# Patient Record
Sex: MALE | Race: WHITE | NOT HISPANIC OR LATINO | Employment: OTHER | URBAN - METROPOLITAN AREA
[De-identification: names, ages, dates, MRNs, and addresses within clinical notes are randomized per-mention and may not be internally consistent; named-entity substitution may affect disease eponyms.]

---

## 2017-01-03 ENCOUNTER — OFFICE VISIT (OUTPATIENT)
Dept: FAMILY MEDICINE | Facility: CLINIC | Age: 57
End: 2017-01-03

## 2017-01-03 VITALS
RESPIRATION RATE: 20 BRPM | TEMPERATURE: 97.6 F | WEIGHT: 175.8 LBS | BODY MASS INDEX: 26.64 KG/M2 | DIASTOLIC BLOOD PRESSURE: 84 MMHG | HEART RATE: 60 BPM | SYSTOLIC BLOOD PRESSURE: 128 MMHG | HEIGHT: 68 IN

## 2017-01-03 DIAGNOSIS — E78.00 PURE HYPERCHOLESTEROLEMIA: ICD-10-CM

## 2017-01-03 DIAGNOSIS — Z11.59 NEED FOR HEPATITIS C SCREENING TEST: ICD-10-CM

## 2017-01-03 DIAGNOSIS — Z23 NEED FOR HEPATITIS A IMMUNIZATION: ICD-10-CM

## 2017-01-03 DIAGNOSIS — Z00.00 ENCOUNTER FOR ROUTINE ADULT HEALTH EXAMINATION WITHOUT ABNORMAL FINDINGS: Primary | ICD-10-CM

## 2017-01-03 DIAGNOSIS — Z13.228 SCREENING FOR METABOLIC DISORDER: ICD-10-CM

## 2017-01-03 PROCEDURE — 80053 COMPREHEN METABOLIC PANEL: CPT | Mod: 90 | Performed by: PHYSICIAN ASSISTANT

## 2017-01-03 PROCEDURE — 80061 LIPID PANEL: CPT | Mod: 90 | Performed by: PHYSICIAN ASSISTANT

## 2017-01-03 PROCEDURE — 90471 IMMUNIZATION ADMIN: CPT | Performed by: PHYSICIAN ASSISTANT

## 2017-01-03 PROCEDURE — 86803 HEPATITIS C AB TEST: CPT | Mod: 90 | Performed by: PHYSICIAN ASSISTANT

## 2017-01-03 PROCEDURE — 99396 PREV VISIT EST AGE 40-64: CPT | Mod: 25 | Performed by: PHYSICIAN ASSISTANT

## 2017-01-03 PROCEDURE — 36415 COLL VENOUS BLD VENIPUNCTURE: CPT | Performed by: PHYSICIAN ASSISTANT

## 2017-01-03 PROCEDURE — 90632 HEPA VACCINE ADULT IM: CPT | Performed by: PHYSICIAN ASSISTANT

## 2017-01-03 RX ORDER — SIMVASTATIN 20 MG
20 TABLET ORAL AT BEDTIME
Qty: 90 TABLET | Refills: 3 | Status: SHIPPED | OUTPATIENT
Start: 2017-01-03 | End: 2018-04-10

## 2017-01-03 NOTE — PROGRESS NOTES
Momo Burns is a 56 year old male presents for routine health maintenance.    Current concerns: Recently was low on simvastatin for the last month and only took it on about half the days. Also has not been eating as well over the holiday. Momo also has been going on yearly mission trips to [place] and would like to be immunized for hepatitis A.     Body mass index is 27.11 kg/(m^2).    Present exercise habits:  Active at work with lifting and walking. Occasional formal work-outs.  Present dietary habits:  eats regular meals and follows a balanced nutrition diet. Could work on fruits and veggies. Eats leans proteins.    Vit D intake: is not taking supplement    Is the patient a smoker? No  If yes, smoking cessation advised and counseling provided.     Cardiovascular risk factors: previous smoker and lipids    Over the past few weeks, have you felt down or depressed? Little interest or pleasure in doing things? No. Lost his wife 1-2 years ago, and recently had a couple days of depressive symptoms, but was able to tap into support system and is now feeling normal again.     Last dental appointment:  this year  Last optical appointment:  this year    Was the patient born between [birth year] and has not had Hep C testing?  Yes, test will be ordered today    I have reviewed the following histories: Past Medical History, Past Surgical History, Social History, Family History, Problem List, Medication List and Allergies    Past Medical History   Diagnosis Date     Tendinitis of hand      Family History   Problem Relation Age of Onset     Respiratory Mother      copd  age 77     Neurologic Disorder Father      cva, heavy smoker , from disease, also cva     Cardiovascular Sister      marfans, aortic valve repair, 2 valves,      Genetic Disorder Sister      marfans     Breast Cancer Sister      40s     Colon Cancer No family hx of      Prostate Cancer No family hx of      Social History     Social History      "Marital Status:      Spouse Name: N/A     Number of Children: N/A     Years of Education: N/A     Occupational History     manager [employer]           Social History Main Topics     Smoking status: Former Smoker -- 0.20 packs/day for 3 years     Types: Cigarettes     Smokeless tobacco: Never Used     Alcohol Use: 1.8 - 3.6 oz/week     3-6 Standard drinks or equivalent per week     Drug Use: Not on file     Sexual Activity: Yes     Other Topics Concern      Service No     Blood Transfusions No     Occupational Exposure No     Hobby Hazards No     Sleep Concern No     Stress Concern No     Weight Concern No     Special Diet No     Back Care No     Exercise Yes     abimbola Fu,      Seat Belt Yes     Self-Exams No     Social History Narrative     Patient Active Problem List   Diagnosis     Mitral valve disorder     Pure hypercholesterolemia     Family history of congenital heart disease/marfans     Health Care Home     ACP (advance care planning)     Current Outpatient Prescriptions   Medication     simvastatin (ZOCOR) 20 MG tablet     [DISCONTINUED] simvastatin (ZOCOR) 20 MG tablet     No current facility-administered medications for this visit.       Allergies:  Allergies   Allergen Reactions     No Known Drug Allergies          ROS:  E/M: NEGATIVE for ear, nose, mouth and throat problems  R: NEGATIVE for significant/chronic cough or SOB  CV: NEGATIVE for chest pain or palpitations  GI: NEGATIVE for abdominal pain, chronic diarrhea or constipation  : NEGATIVE for dysuria, hematuria. Positive for weakened urinary stream.      OBJECTIVE:    Filed Vitals:    01/03/17 0801   BP: 128/84   Pulse: 60   Temp: 97.6  F (36.4  C)   TempSrc: Oral   Resp: 20   Height: 1.715 m (5' 7.5\")   Weight: 79.742 kg (175 lb 12.8 oz)       General: 56 year old male who appears her stated age. Vital signs noted  Head: Normocephalic  Eyes: pupils equal round reactive to light and accomodation, extra ocular " "movements intact  Ears: external canals and tms free of abnormalities  Nose: patent, without mucosal abnormalities  Mouth and throat: without erythema or lesions of the mucosa  Neck: supple, without adenopathy or thyromegaly  Lungs: clear to auscultation, no wheezing or crackles  Cv: regular rate and rhythm, normal s1 and s2 without murmur or click  Abd: soft, non-tender, no masses, no hepatomegaly or splenomegaly.  Gu: normal external genitalia, no hernia  Rectal: no masses, prostate normal size and density.  Ms: normal muscle tone & symmetry  Skin: Clear to inspection  Neuro: sensation and motor function grossly intact; cranial nerves without obvious abnormalities.        ASSESSMENT/PLAN:    1. Encounter for routine adult health examination without abnormal findings    2. Pure hypercholesterolemia  - simvastatin (ZOCOR) 20 MG tablet; Take 1 tablet (20 mg) by mouth At Bedtime  Dispense: 90 tablet; Refill: 3  - VENOUS COLLECTION  - COMPREHENSIVE METABOLIC PANEL (QUEST) XCMP  - Lipid Profile (QUEST)    3. Screening for metabolic disorder  - COMPREHENSIVE METABOLIC PANEL (QUEST) XCMP    4. Need for hepatitis C screening test  - Hepatits C antibody (QUEST)     reports that he has never smoked. He has never used smokeless tobacco.      Estimated body mass index is 27.11 kg/(m^2) as calculated from the following:    Height as of this encounter: 1.715 m (5' 7.5\").    Weight as of this encounter: 79.742 kg (175 lb 12.8 oz).  Weight management plan: Discussed healthy diet and exercise guidelines and patient will follow up in 12 months in clinic to re-evaluate.      Labs pending:      Fasting glucose      Fasting lipids      Hepatitis C  Meds Suggested:      Vitamin D  Tests Recommended:      Regular Dental Examinations        Eye exam  Behavior Modifications:       Cardiovascular exercise 3 times per week--enough to get your Target Heart rate  Immunizations suggested:       Hep A   One, then repeat within 6-12 months  Other " recommendations:     BMI noted and discussed      Regular testicle exam    The patient will return to the clinic if symptoms are changing or concern with follow up as discussed. The patient understands and agrees with the plan.      Anita Gomes PA-C  1/3/2017          Counseling Resources:  ATP IV Guidelines  Pooled Cohorts Equation Calculator  Breast Cancer Risk Calculator  FRAX Risk Assessment  ICSI Preventive Guidelines  Dietary Guidelines for Americans, 2010  [email or web address]'s MyPlate

## 2017-01-03 NOTE — NURSING NOTE
Momo Burns is here for a CPX.    Pre-visit planning  Immunizations -up to date  Colonoscopy -is up to date  Mammogram -  Asthma test --  PHQ9 -  PARTH 7 -    Questioned patient about current smoking habits.  Pt. has never smoked.  Body mass index is 27.11 kg/(m^2).  BP Cuff left  Arm  M  Cuff  PULSE regular  My Chart: active  CLASSIFICATION OF OVERWEIGHT AND OBESITY BY BMI                        Obesity Class           BMI(kg/m2)  Underweight                                    < 18.5  Normal                                         18.5-24.9  Overweight                                     25.0-29.9  OBESITY                     I                  30.0-34.9                             II                 35.0-39.9  EXTREME OBESITY             III                >40                            Patient's  BMI Body mass index is 27.11 kg/(m^2).  Http://hin.nhlbi.nih.gov/menuplanner/menu.cgi  ETOH screening:  Questions:  1-How often do you have a drink containing alcohol?                             3 times per week(s)  2-How many drinks containing alcohol do you have on a typical day when you are         Drinking?                              1 -2  3- How often do you have 5 or more drinks on one occasion?                              never per     Have you ever:  @None of the patient's responses to the CAGE screening were positive / Negative CAGE score@

## 2017-01-04 LAB
ALBUMIN SERPL-MCNC: 4.6 G/DL (ref 3.6–5.1)
ALBUMIN/GLOB SERPL: 1.8 (CALC) (ref 1–2.5)
ALP SERPL-CCNC: 49 U/L (ref 40–115)
ALT SERPL-CCNC: 23 U/L (ref 9–46)
AST SERPL-CCNC: 25 U/L (ref 10–35)
BILIRUB SERPL-MCNC: 0.8 MG/DL (ref 0.2–1.2)
BUN SERPL-MCNC: 13 MG/DL (ref 7–25)
BUN/CREATININE RATIO: NORMAL (CALC) (ref 6–22)
CALCIUM SERPL-MCNC: 9.3 MG/DL (ref 8.6–10.3)
CHLORIDE SERPLBLD-SCNC: 104 MMOL/L (ref 98–110)
CHOLEST SERPL-MCNC: 236 MG/DL (ref 125–200)
CHOLEST/HDLC SERPL: 2.9 (CALC)
CO2 SERPL-SCNC: 28 MMOL/L (ref 20–31)
CREAT SERPL-MCNC: 0.79 MG/DL (ref 0.7–1.33)
EGFR AFRICAN AMERICAN - QUEST: 118 ML/MIN/1.73M2
GFR SERPL CREATININE-BSD FRML MDRD: 102 ML/MIN/1.73M2
GLOBULIN, CALCULATED - QUEST: 2.6 G/DL (CALC) (ref 1.9–3.7)
GLUCOSE - QUEST: 96 MG/DL (ref 65–99)
HCV AB - QUEST: NORMAL
HDLC SERPL-MCNC: 81 MG/DL
LDLC SERPL CALC-MCNC: 142 MG/DL (CALC)
NONHDLC SERPL-MCNC: 155 MG/DL (CALC)
POTASSIUM SERPL-SCNC: 4.4 MMOL/L (ref 3.5–5.3)
PROT SERPL-MCNC: 7.2 G/DL (ref 6.1–8.1)
SIGNAL TO CUT OFF - QUEST: 0.05
SODIUM SERPL-SCNC: 139 MMOL/L (ref 135–146)
TRIGL SERPL-MCNC: 64 MG/DL

## 2017-10-10 ENCOUNTER — ALLIED HEALTH/NURSE VISIT (OUTPATIENT)
Dept: FAMILY MEDICINE | Facility: CLINIC | Age: 57
End: 2017-10-10

## 2017-10-10 DIAGNOSIS — Z23 NEED FOR VACCINATION: Primary | ICD-10-CM

## 2017-10-10 PROCEDURE — 90471 IMMUNIZATION ADMIN: CPT | Performed by: PHYSICIAN ASSISTANT

## 2017-10-10 PROCEDURE — 90632 HEPA VACCINE ADULT IM: CPT | Performed by: PHYSICIAN ASSISTANT

## 2017-10-26 ENCOUNTER — TRANSFERRED RECORDS (OUTPATIENT)
Dept: FAMILY MEDICINE | Facility: CLINIC | Age: 57
End: 2017-10-26

## 2018-03-06 ENCOUNTER — TELEPHONE (OUTPATIENT)
Dept: FAMILY MEDICINE | Facility: CLINIC | Age: 58
End: 2018-03-06

## 2018-03-06 NOTE — TELEPHONE ENCOUNTER
Denied refill of simvastatin to [facility] pharmacy. Pt last seen 1/3/17. Needs OV for refills. Sent Pt my chart

## 2018-04-10 ENCOUNTER — OFFICE VISIT (OUTPATIENT)
Dept: FAMILY MEDICINE | Facility: CLINIC | Age: 58
End: 2018-04-10

## 2018-04-10 VITALS
TEMPERATURE: 98 F | BODY MASS INDEX: 28.09 KG/M2 | SYSTOLIC BLOOD PRESSURE: 114 MMHG | OXYGEN SATURATION: 99 % | HEIGHT: 67 IN | DIASTOLIC BLOOD PRESSURE: 82 MMHG | HEART RATE: 66 BPM | WEIGHT: 179 LBS

## 2018-04-10 DIAGNOSIS — I34.1 MITRAL VALVE PROLAPSE: ICD-10-CM

## 2018-04-10 DIAGNOSIS — E78.00 PURE HYPERCHOLESTEROLEMIA: ICD-10-CM

## 2018-04-10 DIAGNOSIS — Z00.00 ENCOUNTER FOR GENERAL MEDICAL EXAMINATION: Primary | ICD-10-CM

## 2018-04-10 PROCEDURE — 80061 LIPID PANEL: CPT | Mod: 90 | Performed by: PHYSICIAN ASSISTANT

## 2018-04-10 PROCEDURE — 80053 COMPREHEN METABOLIC PANEL: CPT | Mod: 90 | Performed by: PHYSICIAN ASSISTANT

## 2018-04-10 PROCEDURE — 36415 COLL VENOUS BLD VENIPUNCTURE: CPT | Performed by: PHYSICIAN ASSISTANT

## 2018-04-10 PROCEDURE — 99396 PREV VISIT EST AGE 40-64: CPT | Performed by: PHYSICIAN ASSISTANT

## 2018-04-10 RX ORDER — SIMVASTATIN 20 MG
20 TABLET ORAL AT BEDTIME
Qty: 90 TABLET | Refills: 3 | Status: SHIPPED | OUTPATIENT
Start: 2018-04-10 | End: 2019-05-14

## 2018-04-10 NOTE — PROGRESS NOTES
Momo Burns is a 58 year old male presents for routine health maintenance.    Current concerns: Does mention some discomfort in left arm that started when he was lifting.  Is gradually improving. Ongoing chronic knee pain, seen at [facility] 10/2017, and diagnosed with mild OA.    Body mass index is 28.25 kg/(m^2).    Present exercise habits:  Stays active at work, but nothing formal.  Present dietary habits:  eats regular meals and follows a balanced nutrition diet    Vit D intake: is not taking supplement    Is the patient a smoker? No  If yes, smoking cessation advised and counseling provided.     Cardiovascular risk factors: previous smoker    Over the past few weeks, have you felt down or depressed? Little interest or pleasure in doing things? No    Last dental appointment:  last year  Last optical appointment:  this year    Was the patient born between 1941[birth year] and has not had Hep C testing?  No, not applicable    I have reviewed the following histories: Past Medical History, Past Surgical History, Social History, Family History, Problem List, Medication List and Allergies    Past Medical History:   Diagnosis Date     Tendinitis of hand      Family History   Problem Relation Age of Onset     Respiratory Mother      copd  age 77     Neurologic Disorder Father      cva, heavy smoker , from disease, also cva     Cardiovascular Sister      marfans, aortic valve repair, 2 valves,      Genetic Disorder Sister      marfans     Breast Cancer Sister      40s     Colon Cancer No family hx of      Prostate Cancer No family hx of      Social History     Social History     Marital status:      Spouse name: N/A     Number of children: N/A     Years of education: N/A     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     Alcohol use 1.8 - 3.6 oz/week     3 - 6 Standard drinks or equivalent per week     Drug use: Not on file     Sexual activity: Yes     Other Topics Concern      Service No     Blood Transfusions No     Occupational Exposure No     Hobby Hazards No     Sleep Concern No     Stress Concern No     Weight Concern No     Special Diet No     Back Care No     Exercise Yes     abimbola Fu,      Seat Belt Yes     Self-Exams No     Social History Narrative     Patient Active Problem List   Diagnosis     Mitral valve disorder     Pure hypercholesterolemia     Family history of congenital heart disease/marfans     Health Care Home     ACP (advance care planning)     Current Outpatient Prescriptions   Medication     simvastatin (ZOCOR) 20 MG tablet     [DISCONTINUED] simvastatin (ZOCOR) 20 MG tablet     No current facility-administered medications for this visit.        Allergies:    Allergies   Allergen Reactions     No Known Drug Allergies          ROS:  E/M: NEGATIVE for ear, nose, mouth and throat problems  R: NEGATIVE for significant/chronic cough or SOB  CV: NEGATIVE for chest pain or palpitations  GI: NEGATIVE for abdominal pain, chronic diarrhea or constipation  : NEGATIVE for dysuria, hematuria, weakened urinary stream      OBJECTIVE:    Vitals:    04/10/18 0902   BP: 114/82   BP Location: Left arm   Patient Position: Sitting   Cuff Size: Adult Large   Pulse: 66   Temp: 98  F (36.7  C)   TempSrc: Oral   SpO2: 99%   Weight: 81.2 kg (179 lb)   Height: 1.695 m (5' 6.75\")       General: 58 year old male who appears his stated age. Vital signs noted.  Head: Normocephalic  Eyes: pupils equal round reactive to light and accomodation, extra ocular movements intact  Ears: external canals and tms free of abnormalities  Nose: patent, without mucosal abnormalities  Mouth and throat: without erythema or lesions of the mucosa  Neck: supple, without adenopathy or thyromegaly  Lungs: clear to auscultation, no " "wheezing or crackles  Cv: regular rate and rhythm, normal s1 and s2 with mid systolic click and murmur  Abd: soft, non-tender, no masses, no hepatomegaly or splenomegaly.  Gu: Patient declined.  Rectal: Patient declined  Ms: normal muscle tone & symmetry  Skin: Clear to inspection  Neuro: sensation and motor function grossly intact; cranial nerves without obvious abnormalities.        ASSESSMENT/PLAN:    1. Encounter for general medical examination  Momo is doing well. Will inform of fasting labs results via stickKt along with any further recommendations. Refilled simvastatin for 1 year. Encouraged diet and exercise, with goal of weight gain.     2. Pure hypercholesterolemia  - simvastatin (ZOCOR) 20 MG tablet; Take 1 tablet (20 mg) by mouth At Bedtime  Dispense: 90 tablet; Refill: 3  - VENOUS COLLECTION  - Lipid Profile (QUEST)  - Comprehensive metabolic panel     reports that he has quit smoking. His smoking use included Cigarettes. He has a 0.60 pack-year smoking history. He has never used smokeless tobacco.      Estimated body mass index is 28.25 kg/(m^2) as calculated from the following:    Height as of this encounter: 1.695 m (5' 6.75\").    Weight as of this encounter: 81.2 kg (179 lb).  Weight management plan: Discussed healthy diet and exercise guidelines and patient will follow up in 12 months in clinic to re-evaluate.      Labs pending:      Fasting glucose      Fasting lipids  Meds Suggested:      Vitamin D       Calcium  Tests Recommended:      Regular Dental Examinations        Eye exam  Behavior Modifications:       Cardiovascular exercise 3 times per week--enough to get your Target Heart rate  Other recommendations:     BMI noted and discussed      Regular testicle exam     Encouraged My Chart    The patient will return to the clinic if symptoms are changing or concern with follow up as discussed. The patient understands and agrees with the plan.      Anita Gomes, " EDDY  4/10/2018          Counseling Resources:  ATP IV Guidelines  Pooled Cohorts Equation Calculator  Breast Cancer Risk Calculator  FRAX Risk Assessment  ICSI Preventive Guidelines  Dietary Guidelines for Americans, 2010  [employer]'s MyPlate

## 2018-04-10 NOTE — MR AVS SNAPSHOT
After Visit Summary   4/10/2018    Momo Burns    MRN: [id number]           Patient Information     Date Of Birth          1960        Visit Information        Provider Department      4/10/2018 9:00 AM Anita Gomes PA-C [facility]        Today's Diagnoses     Encounter for general medical examination    -  1    Pure hypercholesterolemia        Mitral valve prolapse           Follow-ups after your visit        Follow-up notes from your care team     Return in about 1 year (around 4/10/2019) for Physical Exam.      Who to contact     If you have questions or need follow up information about today's clinic visit or your schedule please contact [facility] directly at [phone].  Normal or non-critical lab and imaging results will be communicated to you by [employer] Stam[employer]art, letter or phone within 4 business days after the clinic has received the results. If you do not hear from us within 7 days, please contact the clinic through [employer] Stam[employer] or phone. If you have a critical or abnormal lab result, we will notify you by phone as soon as possible.  Submit refill requests through [employer] or call your pharmacy and they will forward the refill request to us. Please allow 3 business days for your refill to be completed.          Additional Information About Your Visit        MyChart Information     [employer] gives you secure access to your electronic health record. If you see a primary care provider, you can also send messages to your care team and make appointments. If you have questions, please call your primary care clinic.  If you do not have a primary care provider, please call [phone] and they will assist you.        Care EveryWhere ID     This is your Care EveryWhere ID. This could be used by other organizations to access your [facility] medical records  [id number]        Your Vitals Were     Pulse Temperature Height Pulse Oximetry BMI (Body Mass  "Index)       66 98  F (36.7  C) (Oral) 1.695 m (5' 6.75\") 99% 28.25 kg/m2        Blood Pressure from Last 3 Encounters:   04/10/18 114/82   01/03/17 128/84   05/02/16 134/88    Weight from Last 3 Encounters:   04/10/18 81.2 kg (179 lb)   01/03/17 79.7 kg (175 lb 12.8 oz)   05/02/16 80 kg (176 lb 6.4 oz)              We Performed the Following     Comprehensive metabolic panel     Lipid Profile (QUEST)     VENOUS COLLECTION          Where to get your medicines      These medications were sent to [id number] - [place], [place] - [address]  [address], [facility] [address]     Phone:  [phone]     simvastatin 20 MG tablet          Primary Care Provider Office Phone # Fax #    Anita Renée Gomes PA-C [phone] [address]  [facility] [address]        Equal Access to Services     [facility]: Hadii aad ku hadasho Soomaali, waaxda luqadaha, qaybta kaalmada adeegyada, waxay brittany hayvish medina . So [facility] [phone].    ATENCIÓN: Si habla español, tiene a christensen disposición servicios gratuitos de asistencia lingüística. Llame al [phone].    We comply with applicable federal civil rights laws and [place] laws. We do not discriminate on the basis of race, color, national origin, age, disability, sex, sexual orientation, or gender identity.            Thank you!     Thank you for choosing [facility]  for your care. Our goal is always to provide you with excellent care. Hearing back from our patients is one way we can continue to improve our services. Please take a few minutes to complete the written survey that you may receive in the mail after your visit with us. Thank you!             Your Updated Medication List - Protect others around you: Learn how to safely use, store and throw away your medicines at www.disposemymeds.org.          This list is accurate as of 4/10/18 11:02 AM.  Always use your most recent " med list.                   Brand Name Dispense Instructions for use Diagnosis    simvastatin 20 MG tablet    ZOCOR    90 tablet    Take 1 tablet (20 mg) by mouth At Bedtime    Pure hypercholesterolemia

## 2018-04-10 NOTE — NURSING NOTE
Momo is here for annual physical and is fasting.     Patient is here for a full physical exam.    Pre-Visit Screening :  Immunizations : up to date  Colon Screening : is up to date   5/10/13  Mammogram: NA  Asthma Action Test/Plan : No concerns   PHQ9 :  PHQ-2 done   GAD7 :  No concerns  Patient's  BMI Body mass index is 28.25 kg/(m^2).  Questioned patient about current smoking habits.  Pt. quit smoking some time ago.  OK to leave a detailed voice message regarding today's visit Yes, phone # [phone]   (cell)      ETOH screening:  Questions:  1-How often do you have a drink containing alcohol?                             5 times per week(s)  2-How many drinks containing alcohol do you have on a typical day when you are         Drinking?                              1-5  3- How often do you have 5 or more drinks on one occasion?                              Never

## 2018-04-11 LAB
ALBUMIN SERPL-MCNC: 4.5 G/DL (ref 3.6–5.1)
ALBUMIN/GLOB SERPL: 1.8 (CALC) (ref 1–2.5)
ALP SERPL-CCNC: 54 U/L (ref 40–115)
ALT SERPL-CCNC: 26 U/L (ref 9–46)
AST SERPL-CCNC: 20 U/L (ref 10–35)
BILIRUB SERPL-MCNC: 0.7 MG/DL (ref 0.2–1.2)
BUN SERPL-MCNC: 15 MG/DL (ref 7–25)
BUN/CREATININE RATIO: NORMAL (CALC) (ref 6–22)
CALCIUM SERPL-MCNC: 9.4 MG/DL (ref 8.6–10.3)
CHLORIDE SERPLBLD-SCNC: 103 MMOL/L (ref 98–110)
CHOLEST SERPL-MCNC: 213 MG/DL
CHOLEST/HDLC SERPL: 3 (CALC)
CO2 SERPL-SCNC: 27 MMOL/L (ref 20–31)
CREAT SERPL-MCNC: 0.81 MG/DL (ref 0.7–1.33)
EGFR AFRICAN AMERICAN - QUEST: 115 ML/MIN/1.73M2
GFR SERPL CREATININE-BSD FRML MDRD: 99 ML/MIN/1.73M2
GLOBULIN, CALCULATED - QUEST: 2.5 G/DL (CALC) (ref 1.9–3.7)
GLUCOSE - QUEST: 92 MG/DL (ref 65–99)
HDLC SERPL-MCNC: 71 MG/DL
LDLC SERPL CALC-MCNC: 118 MG/DL (CALC)
NONHDLC SERPL-MCNC: 142 MG/DL (CALC)
POTASSIUM SERPL-SCNC: 4.7 MMOL/L (ref 3.5–5.3)
PROT SERPL-MCNC: 7 G/DL (ref 6.1–8.1)
SODIUM SERPL-SCNC: 137 MMOL/L (ref 135–146)
TRIGL SERPL-MCNC: 129 MG/DL

## 2019-05-14 ENCOUNTER — OFFICE VISIT (OUTPATIENT)
Dept: FAMILY MEDICINE | Facility: CLINIC | Age: 59
End: 2019-05-14

## 2019-05-14 VITALS
HEART RATE: 68 BPM | DIASTOLIC BLOOD PRESSURE: 86 MMHG | SYSTOLIC BLOOD PRESSURE: 118 MMHG | BODY MASS INDEX: 27.13 KG/M2 | TEMPERATURE: 98.5 F | WEIGHT: 179 LBS | HEIGHT: 68 IN | OXYGEN SATURATION: 98 %

## 2019-05-14 DIAGNOSIS — Z12.5 SCREENING FOR PROSTATE CANCER: ICD-10-CM

## 2019-05-14 DIAGNOSIS — Z00.00 ENCOUNTER FOR GENERAL MEDICAL EXAMINATION: Primary | ICD-10-CM

## 2019-05-14 DIAGNOSIS — E78.00 PURE HYPERCHOLESTEROLEMIA: ICD-10-CM

## 2019-05-14 LAB
ALBUMIN SERPL-MCNC: 4.6 G/DL (ref 3.6–5.1)
ALP SERPL-CCNC: 55 U/L (ref 40–115)
ALT 1742-6: 15 U/L (ref 9–46)
AST 1920-8: 15 U/L (ref 10–35)
BILIRUB SERPL-MCNC: 1 MG/DL (ref 0.2–1.2)
BUN SERPL-MCNC: 12 MG/DL (ref 7–25)
CALCIUM SERPL-MCNC: 9.2 MG/DL (ref 8.6–10.3)
CHLORIDE SERPLBLD-SCNC: 105.9 MMOL/L (ref 98–110)
CHOLEST SERPL-MCNC: 173 MG/DL (ref 0–199)
CHOLEST/HDLC SERPL: 2 {RATIO}
CO2 SERPL-SCNC: 28.9 MMOL/L (ref 20–32)
CREAT SERPL-MCNC: 0.87 MG/DL (ref 0.7–1.18)
GLUCOSE SERPL-MCNC: 100 MG/DL (ref 60–99)
HDLC SERPL-MCNC: 81 MG/DL (ref 40–150)
LDLC SERPL CALC-MCNC: 80 MG/DL (ref 0–99)
POTASSIUM SERPL-SCNC: 5.31 MMOL/L (ref 3.5–5.3)
PROT SERPL-MCNC: 7.2 G/DL (ref 6.1–8.1)
SODIUM SERPL-SCNC: 142.1 MMOL/L (ref 135–146)
TRIGL SERPL-MCNC: 61 MG/DL (ref 0–149)

## 2019-05-14 PROCEDURE — 80061 LIPID PANEL: CPT | Performed by: PHYSICIAN ASSISTANT

## 2019-05-14 PROCEDURE — 84153 ASSAY OF PSA TOTAL: CPT | Mod: 90 | Performed by: PHYSICIAN ASSISTANT

## 2019-05-14 PROCEDURE — 99396 PREV VISIT EST AGE 40-64: CPT | Performed by: PHYSICIAN ASSISTANT

## 2019-05-14 PROCEDURE — 80053 COMPREHEN METABOLIC PANEL: CPT | Performed by: PHYSICIAN ASSISTANT

## 2019-05-14 PROCEDURE — 36415 COLL VENOUS BLD VENIPUNCTURE: CPT | Performed by: PHYSICIAN ASSISTANT

## 2019-05-14 RX ORDER — SIMVASTATIN 20 MG
20 TABLET ORAL AT BEDTIME
Qty: 90 TABLET | Refills: 3 | Status: SHIPPED | OUTPATIENT
Start: 2019-05-14 | End: 2020-06-12

## 2019-05-14 SDOH — HEALTH STABILITY: MENTAL HEALTH: HOW OFTEN DO YOU HAVE 6 OR MORE DRINKS ON ONE OCCASION?: NEVER

## 2019-05-14 SDOH — HEALTH STABILITY: MENTAL HEALTH: HOW MANY STANDARD DRINKS CONTAINING ALCOHOL DO YOU HAVE ON A TYPICAL DAY?: 1 OR 2

## 2019-05-14 SDOH — HEALTH STABILITY: MENTAL HEALTH: HOW OFTEN DO YOU HAVE A DRINK CONTAINING ALCOHOL?: 4 OR MORE TIMES A WEEK

## 2019-05-14 ASSESSMENT — MIFFLIN-ST. JEOR: SCORE: 1611.44

## 2019-05-14 NOTE — PROGRESS NOTES
Momo Burns is a 59 year old male presents for routine health maintenance.    Current concerns: None     Body mass index is 27.22 kg/m .    Present exercise habits:  1-3 times/week, hikes  Present dietary habits:  eats regular meals and follows a balanced nutrition diet    Vit D intake: is not taking supplement    Is the patient a smoker? No  If yes, smoking cessation advised and counseling provided.     Cardiovascular risk factors: lipids    Over the past few weeks, have you felt down or depressed? Little interest or pleasure in doing things? No concerns doing well    Last dental appointment:  this year  Last optical appointment:  this year    Was the patient born between [birth year] and has not had Hep C testing?  Patient has already been tested    I have reviewed the following histories: Past Medical History, Past Surgical History, Social History, Family History, Problem List, Medication List and Allergies    Past Medical History:   Diagnosis Date     Tendinitis of hand      Family History   Problem Relation Age of Onset     Respiratory Mother         copd  age 77     Neurologic Disorder Father         cva, heavy smoker , from disease     Cardiovascular Sister         marfans, aortic valve repair, 2 valves,      Genetic Disorder Sister         marfans     Breast Cancer Sister         40s     Bladder Cancer Brother         age 60      Colon Cancer No family hx of      Prostate Cancer No family hx of      Social History     Socioeconomic History     Marital status:      Spouse name: Not on file     Number of children: Not on file     Years of education: Not on file     Highest education level: Not on file   Occupational History     Occupation: manager     Employer: [employer]     Comment:     Social Needs     Financial resource strain: Not on file     Food insecurity:     Worry: Not on file     Inability: Not on file     Transportation needs:     Medical: Not on file      Non-medical: Not on file   Tobacco Use     Smoking status: Former Smoker     Packs/day: 0.20     Years: 3.00     Pack years: 0.60     Types: Cigarettes     Smokeless tobacco: Never Used     Tobacco comment: teenage years.   Substance and Sexual Activity     Alcohol use: Yes     Alcohol/week: 1.8 - 3.6 oz     Types: 3 - 6 Standard drinks or equivalent per week     Frequency: 4 or more times a week     Drinks per session: 1 or 2     Binge frequency: Never     Drug use: Not Currently     Types: Marijuana     Sexual activity: Not Currently   Lifestyle     Physical activity:     Days per week: Not on file     Minutes per session: Not on file     Stress: Not on file   Relationships     Social connections:     Talks on phone: Not on file     Gets together: Not on file     Attends Jain service: Not on file     Active member of club or organization: Not on file     Attends meetings of clubs or organizations: Not on file     Relationship status: Not on file     Intimate partner violence:     Fear of current or ex partner: Not on file     Emotionally abused: Not on file     Physically abused: Not on file     Forced sexual activity: Not on file   Other Topics Concern      Service No     Blood Transfusions No     Caffeine Concern Not Asked     Occupational Exposure No     Hobby Hazards No     Sleep Concern No     Stress Concern No     Weight Concern No     Special Diet No     Back Care No     Exercise Yes     Comment: abimb[employer] [place],      Bike Helmet Not Asked     Seat Belt Yes     Self-Exams No   Social History Narrative     Not on file     Patient Active Problem List   Diagnosis     Mitral valve prolapse     Pure hypercholesterolemia     Family history of congenital heart disease/marfans     Health Care Home     ACP (advance care planning)     Current Outpatient Medications   Medication     simvastatin (ZOCOR) 20 MG tablet     No current facility-administered medications for this visit.        Allergies:    Allergies  "  Allergen Reactions     No Known Drug Allergies          ROS:  E/M: NEGATIVE for ear, nose, mouth and throat problems  R: NEGATIVE for significant/chronic cough or SOB  CV: NEGATIVE for chest pain or palpitations  GI: NEGATIVE for abdominal pain, chronic diarrhea or constipation  : NEGATIVE for dysuria, hematuria, weakened urinary stream      OBJECTIVE:    Vitals:    05/14/19 0901   BP: 118/86   BP Location: Right arm   Patient Position: Sitting   Cuff Size: Adult Regular   Pulse: 68   Temp: 98.5  F (36.9  C)   TempSrc: Oral   SpO2: 98%   Weight: 81.2 kg (179 lb)   Height: 1.727 m (5' 8\")       General: 59 year old male who appears his stated age. Vital signs noted.  Head: Normocephalic  Eyes: pupils equal round reactive to light and accomodation, extra ocular movements intact  Ears: external canals and tms free of abnormalities  Nose: patent, without mucosal abnormalities  Mouth and throat: without erythema or lesions of the mucosa  Neck: supple, without adenopathy or thyromegaly  Lungs: clear to auscultation, no wheezing or crackles  Cv: regular rate and rhythm, normal s1 and s2 without murmur or click  Abd: soft, non-tender, no masses, no hepatomegaly or splenomegaly.  Gu: Deferred until next year.   Rectal: Deferred until next year.   Ms: normal muscle tone & symmetry  Skin: Clear to inspection  Neuro: sensation and motor function grossly intact; cranial nerves without obvious abnormalities.        ASSESSMENT/PLAN:    1. Encounter for general medical examination  Kiran is doing well today. Will check fasting labs, and send MyChart with results when available. Getting baseline PSA checked today as well. Refilled simvastatin at current dose for 1 year.     2. Pure hypercholesterolemia  - simvastatin (ZOCOR) 20 MG tablet; Take 1 tablet (20 mg) by mouth At Bedtime  Dispense: 90 tablet; Refill: 3  - VENOUS COLLECTION  - Comprehensive Metobolic Panel (BFP)  - Lipid Panel (BFP)    3. Screening for prostate cancer  - " "PSA, SCREENING     reports that he has quit smoking. His smoking use included cigarettes. He has a 0.60 pack-year smoking history. He has never used smokeless tobacco.    Estimated body mass index is 27.22 kg/m  as calculated from the following:    Height as of this encounter: 1.727 m (5' 8\").    Weight as of this encounter: 81.2 kg (179 lb).  Weight management plan: Discussed healthy diet and exercise guidelines      Labs pending:      Fasting glucose      Fasting lipids      PSA  Meds Suggested:      Vitamin D       Calcium  Tests Recommended:      Regular Dental Examinations        Eye exam  Behavior Modifications:       Cardiovascular exercise 3 times per week--enough to get your Target Heart rate  Other recommendations:     BMI noted and discussed      Regular testicle exam     Encouraged My Chart    The patient will return to the clinic if symptoms are changing or concern with follow up as discussed. The patient understands and agrees with the plan.      Anita Gomes PA-C  5/14/2019          Counseling Resources:  ATP IV Guidelines  Pooled Cohorts Equation Calculator  Breast Cancer Risk Calculator  FRAX Risk Assessment  ICSI Preventive Guidelines  Dietary Guidelines for Americans, 2010  [employer] My[employer]    "

## 2019-05-14 NOTE — NURSING NOTE
Patient is here for a full physical exam.    Pre-Visit Screening :  Immunizations : up to date  Colon Screening : is up to date  Mammogram: NA  Asthma Action Test/Plan : NA  PHQ9 :  PHQ-2 done today   GAD7 :  No concerns  Patient's  BMI There is no height or weight on file to calculate BMI.  Questioned patient about current smoking habits.  Pt. quit smoking some time ago.  OK to leave a detailed voice message regarding today's visit Yes, phone # [phone]

## 2019-05-15 LAB — ABBOTT PSA - QUEST: 0.8 NG/ML

## 2019-09-27 ENCOUNTER — HEALTH MAINTENANCE LETTER (OUTPATIENT)
Age: 59
End: 2019-09-27

## 2020-01-20 ENCOUNTER — TRANSFERRED RECORDS (OUTPATIENT)
Dept: FAMILY MEDICINE | Facility: CLINIC | Age: 60
End: 2020-01-20

## 2020-01-30 ENCOUNTER — TRANSFERRED RECORDS (OUTPATIENT)
Dept: FAMILY MEDICINE | Facility: CLINIC | Age: 60
End: 2020-01-30

## 2020-03-03 ENCOUNTER — OFFICE VISIT (OUTPATIENT)
Dept: FAMILY MEDICINE | Facility: CLINIC | Age: 60
End: 2020-03-03

## 2020-03-03 ENCOUNTER — HOSPITAL ENCOUNTER (OUTPATIENT)
Dept: LAB | Facility: CLINIC | Age: 60
Discharge: HOME | End: 2020-03-03
Attending: PHYSICIAN ASSISTANT | Admitting: PHYSICIAN ASSISTANT
Payer: COMMERCIAL

## 2020-03-03 VITALS
SYSTOLIC BLOOD PRESSURE: 120 MMHG | DIASTOLIC BLOOD PRESSURE: 62 MMHG | BODY MASS INDEX: 26.52 KG/M2 | RESPIRATION RATE: 20 BRPM | HEIGHT: 68 IN | OXYGEN SATURATION: 99 % | HEART RATE: 76 BPM | WEIGHT: 175 LBS

## 2020-03-03 DIAGNOSIS — Z01.818 PRE-OP EVALUATION: Primary | ICD-10-CM

## 2020-03-03 DIAGNOSIS — G89.29 CHRONIC PAIN OF LEFT KNEE: ICD-10-CM

## 2020-03-03 DIAGNOSIS — Z01.818 PREOP GENERAL PHYSICAL EXAM: Primary | ICD-10-CM

## 2020-03-03 DIAGNOSIS — E78.00 PURE HYPERCHOLESTEROLEMIA: ICD-10-CM

## 2020-03-03 DIAGNOSIS — M25.562 CHRONIC PAIN OF LEFT KNEE: ICD-10-CM

## 2020-03-03 LAB
ALBUMIN SERPL-MCNC: 4.5 G/DL (ref 3.6–5.1)
ALBUMIN/GLOB SERPL: 1.7 {RATIO} (ref 1–2.5)
ALP SERPL-CCNC: 57 U/L (ref 33–130)
ALT 1742-6: 8 U/L (ref 0–32)
AST 1920-8: 11 U/L (ref 0–35)
BILIRUB SERPL-MCNC: 1 MG/DL (ref 0.2–1.2)
BUN SERPL-MCNC: 15 MG/DL (ref 7–25)
BUN/CREATININE RATIO: 17.4 (ref 6–22)
CALCIUM SERPL-MCNC: 9.4 MG/DL (ref 8.6–10.3)
CHLORIDE SERPLBLD-SCNC: 104.7 MMOL/L (ref 98–110)
CO2 SERPL-SCNC: 30.9 MMOL/L (ref 20–32)
CREAT SERPL-MCNC: 0.86 MG/DL (ref 0.7–1.18)
ERYTHROCYTE [DISTWIDTH] IN BLOOD BY AUTOMATED COUNT: 13.4 %
GLOBULIN, CALCULATED - QUEST: 2.7 (ref 1.9–3.7)
GLUCOSE SERPL-MCNC: 86 MG/DL (ref 60–99)
HCT VFR BLD AUTO: 41.4 % (ref 40–53)
HEMOGLOBIN: 14.2 G/DL (ref 13.3–17.7)
MCH RBC QN AUTO: 30.7 PG (ref 26–33)
MCHC RBC AUTO-ENTMCNC: 34.3 G/DL (ref 31–36)
MCV RBC AUTO: 89.5 FL (ref 78–100)
MRSA DNA SPEC QL NAA+PROBE: NEGATIVE
PLATELET COUNT - QUEST: 245 10^9/L (ref 150–375)
POTASSIUM SERPL-SCNC: 4.5 MMOL/L (ref 3.5–5.3)
PROT SERPL-MCNC: 7.2 G/DL (ref 6.1–8.1)
RBC # BLD AUTO: 4.63 10*12/L (ref 4.4–5.9)
SODIUM SERPL-SCNC: 144.5 MMOL/L (ref 135–146)
SPECIMEN SOURCE: NORMAL
WBC # BLD AUTO: 5 10*9/L (ref 4–11)

## 2020-03-03 PROCEDURE — 80053 COMPREHEN METABOLIC PANEL: CPT | Performed by: PHYSICIAN ASSISTANT

## 2020-03-03 PROCEDURE — 85027 COMPLETE CBC AUTOMATED: CPT | Performed by: PHYSICIAN ASSISTANT

## 2020-03-03 PROCEDURE — 36415 COLL VENOUS BLD VENIPUNCTURE: CPT | Performed by: PHYSICIAN ASSISTANT

## 2020-03-03 PROCEDURE — 93000 ELECTROCARDIOGRAM COMPLETE: CPT | Performed by: PHYSICIAN ASSISTANT

## 2020-03-03 PROCEDURE — 87641 MR-STAPH DNA AMP PROBE: CPT | Performed by: PHYSICIAN ASSISTANT

## 2020-03-03 PROCEDURE — 99214 OFFICE O/P EST MOD 30 MIN: CPT | Performed by: PHYSICIAN ASSISTANT

## 2020-03-03 PROCEDURE — 87640 STAPH A DNA AMP PROBE: CPT | Performed by: PHYSICIAN ASSISTANT

## 2020-03-03 ASSESSMENT — MIFFLIN-ST. JEOR: SCORE: 1585.35

## 2020-03-03 NOTE — LETTER
[facility]  [address] [place] [address]  [address]  [facility] [address]  [phone]  Dept: [phone]    PRE-OP EVALUATION:  Today's date: 3/3/2020    Momo Burns (: 1960) presents for pre-operative evaluation assessment as requested by Dr. Almanza.  He requires evaluation and anesthesia risk assessment prior to undergoing surgery/procedure for treatment of left knee cartilage deterioration.    Proposed Surgery/ Procedure: Left knee replacement  Date of Surgery/ Procedure: 3/11/20   Time of Surgery/ Procedure: TBD-will be in the afternoon time  Hospital/Surgical Facility: [facility]   Will give to [facility] at [facility]  Primary Physician: Anita Gomes  Type of Anesthesia Anticipated: General    Patient has a Health Care Directive or Living Will:  NO    1. NO - Do you have a history of heart attack, stroke, stent, bypass or surgery on an artery in the head, neck, heart or legs?  2. NO - Do you ever have any pain or discomfort in your chest?  3. NO - Do you have a history of  Heart Failure?  4. NO - Are you troubled by shortness of breath when: walking on the level, up a slight hill or at night?  5. NO - Do you currently have a cold, bronchitis or other respiratory infection?  6. NO - Do you have a cough, shortness of breath or wheezing?  7. NO - Do you sometimes get pains in the calves of your legs when you walk?  8. NO - Do you or anyone in your family have previous history of blood clots?  9. NO - Do you or does anyone in your family have a serious bleeding problem such as prolonged bleeding following surgeries or cuts?  10. NO - Have you ever had problems with anemia or been told to take iron pills?  11. NO - Have you had any abnormal blood loss such as black, tarry or bloody stools, or abnormal vaginal bleeding?  12. NO - Have you ever had a blood transfusion?  13. NO - Have you or any of your relatives ever had problems with anesthesia?  14. NO - Do you have  sleep apnea, excessive snoring or daytime drowsiness?  15. NO - Do you have any prosthetic heart valves?  16. NO - Do you have prosthetic joints?  17. NO - Is there any chance that you may be pregnant?      HPI:     HPI related to upcoming procedure: Had bone chip surgery in approximately 1983 where piece of bone had to be reattached from fracture. 3 years ago, Kiran developed significant left knee pain without any additional injury. Based on x-ray and x-ray, plan is for partial or possible total left knee replacement.     HYPERLIPIDEMIA - Patient has a long history of significant Hyperlipidemia requiring medication for treatment with recent good control. Patient reports no problems or side effects with the medication.     MEDICAL HISTORY:     Patient Active Problem List    Diagnosis Date Noted     ACP (advance care planning) 10/07/2015     Priority: Medium     Advance Care Planning 10/7/2015: ACP Review and Resources Provided:  Reviewed chart for advance care plan.  Momo AVILES Burns has no plan or code status on file. Discussed available resources and provided with information. Confirmed code status reflects current choices pending further ACP discussions.  Confirmed/documented legally designated decision maker(s). Added by Stephenie Reyes             Family history of congenital heart disease/marfans 12/01/2010     Priority: Medium     Pure hypercholesterolemia 12/10/2004     Priority: Medium     Mitral valve prolapse 12/01/2004     Priority: Medium     Confirmed  Via echo [address] [place] [address] 09/19/2012     Priority: Low     State Tier Level:  Tier 1  Status:  n/a  Care Coordinator:      See Letters for [facility] Care Plan            Past Medical History:   Diagnosis Date     Tendinitis of hand      Past Surgical History:   Procedure Laterality Date     carpal tunel release  2016    right     HC KNEE SCOPE, DIAGNOSTIC  age 24    Arthroscopy, Knee/ , bone chip, ?dessicans      HC VASECTOMY UNILAT/BILAT W POSTOP  "SEMEN  1999    Vasectomy     RELEASE TRIGGER FINGER  2016    3rd digit of left hand     trigger finger surgery left thumb  2015     Current Outpatient Medications   Medication Sig Dispense Refill     simvastatin (ZOCOR) 20 MG tablet Take 1 tablet (20 mg) by mouth At Bedtime 90 tablet 3     OTC products: Takes ibuprofen, but will stop 7 days before and use Tylenol.     Allergies   Allergen Reactions     No Known Drug Allergies       Latex Allergy: NO    Social History     Tobacco Use     Smoking status: Former Smoker     Packs/day: 0.20     Years: 3.00     Pack years: 0.60     Types: Cigarettes     Smokeless tobacco: Never Used     Tobacco comment: teenage years.   Substance Use Topics     Alcohol use: Yes     Alcohol/week: 3.0 - 6.0 standard drinks     Types: 3 - 6 Standard drinks or equivalent per week     Frequency: 4 or more times a week     Drinks per session: 1 or 2     Binge frequency: Never     History   Drug Use Unknown       REVIEW OF SYSTEMS:   Constitutional, neuro, ENT, endocrine, pulmonary, cardiac, gastrointestinal, genitourinary, musculoskeletal, integument and psychiatric systems are negative, except as otherwise noted.    EXAM:   /62 (BP Location: Right arm, Patient Position: Sitting, Cuff Size: Adult Regular)   Pulse 76   Resp 20   Ht 1.715 m (5' 7.5\")   Wt 79.4 kg (175 lb)   SpO2 99%   BMI 27.00 kg/m      GENERAL APPEARANCE: healthy, alert and no distress     EYES: EOMI,  PERRL     HENT: ear canals and TM's normal and nose and mouth without ulcers or lesions     NECK: no adenopathy, no asymmetry, masses, or scars and thyroid normal to palpation     RESP: lungs clear to auscultation - no rales, rhonchi or wheezes     CV: regular rates and rhythm, normal S1 S2, no S3 or S4 and no murmur, click or rub     ABDOMEN:  soft, nontender, no HSM or masses and bowel sounds normal     MS: extremities normal- no gross deformities noted, no evidence of inflammation in joints, FROM in all " extremities.     SKIN: no suspicious lesions or rashes     NEURO: Normal strength and tone, sensory exam grossly normal, mentation intact and speech normal     PSYCH: mentation appears normal. and affect normal/bright     LYMPHATICS: No cervical adenopathy    DIAGNOSTICS:   EKG: appears normal, NSR, normal axis, normal intervals, no acute ST/T changes c/w ischemia, no LVH by voltage criteria, unchanged from previous tracings  Hemoglobin (indicated for history of anemia or procedure with significant blood loss such as tonsillectomy, major intraperitoneal surgery, vascular surgery, major spine surgery, total joint replacement)  Serum Potassium  Serum Creatinine    Hospital Outpatient Visit on 03/03/2020   Component Date Value Ref Range Status     Specimen Description 03/03/2020 Nares   Final     Methicillin Resist/Sens S. aureus * 03/03/2020 Negative  NEG^Negative Final    Comment: MRSA Negative: SA Negative  MRSA and Staphylococcus aureus target DNA not   detected, presumed negative for MRSA and SA colonization or the number of   bacteria present may be below the limit of detection for the assay. FDA   approved assay performed using [employer] GeneXpert(R) real-time PCR.     Office Visit on 03/03/2020   Component Date Value Ref Range Status     WBC 03/03/2020 5.0  4.0 - 11 10*9/L Final     RBC Count 03/03/2020 4.63  4.4 - 5.9 10*12/L Final     Hemoglobin 03/03/2020 14.2  13.3 - 17.7 g/dL Final     Hematocrit 03/03/2020 41.4  40.0 - 53.0 % Final     MCV 03/03/2020 89.5  78 - 100 fL Final     MCH 03/03/2020 30.7  26 - 33 pg Final     MCHC 03/03/2020 34.3  31 - 36 g/dL Final     RDW 03/03/2020 13.4  % Final     Platelet Count 03/03/2020 245  150 - 375 10^9/L Final     Carbon Dioxide 03/03/2020 30.9  20 - 32 mmol/L Final     Creatinine 03/03/2020 0.86  0.70 - 1.18 mg/dL Final     Glucose 03/03/2020 86  60 - 99 mg/dL Final     Sodium 03/03/2020 144.5  135 - 146 mmol/L Final     Potassium 03/03/2020 4.50  3.5 - 5.3 mmol/L  Final     Chloride 03/03/2020 104.7  98 - 110 mmol/L Final     Protein Total 03/03/2020 7.2  6.1 - 8.1 g/dL Final     Albumin 03/03/2020 4.5  3.6 - 5.1 g/dL Final     Alkaline Phosphatase 03/03/2020 57  33 - 130 U/L Final     ALT 03/03/2020 8  0 - 32 U/L Final     AST 03/03/2020 11  0 - 35 U/L Final     Bilirubin Total 03/03/2020 1.0  0.2 - 1.2 mg/dL Final     Urea Nitrogen 03/03/2020 15  7 - 25 mg/dL Final     Calcium 03/03/2020 9.4  8.6 - 10.3 mg/dL Final     BUN/Creatinine Ratio 03/03/2020 17.4  6 - 22 Final     Globulin Calculated 03/03/2020 2.7  1.9 - 3.7 Final     A/G Ratio 03/03/2020 1.7  1 - 2.5 Final       IMPRESSION:   Reason for surgery/procedure: Left knee pain, arthritis  Diagnosis/reason for consult: Pre-operative Examination    The proposed surgical procedure is considered INTERMEDIATE risk.    REVISED CARDIAC RISK INDEX  The patient has the following serious cardiovascular risks for perioperative complications such as (MI, PE, VFib and 3  AV Block):  No serious cardiac risks  INTERPRETATION: 1 risks: Class II (low risk - 0.9% complication rate)    The patient has the following additional risks for perioperative complications:  No identified additional risks      ICD-10-CM    1. Encounter for general medical examination Z00.00    2. Pre-operative examination Z01.818 VENOUS COLLECTION     HEMOGRAM/PLATELET (BFP)     Comprehensive Metobolic Panel (BFP)     EKG 12-lead complete w/read - Clinics   3. Chronic pain of left knee M25.562 VENOUS COLLECTION    G89.29 HEMOGRAM/PLATELET (BFP)     Comprehensive Metobolic Panel (BFP)     EKG 12-lead complete w/read - Clinics   4. Pure hypercholesterolemia E78.00 EKG 12-lead complete w/read - Clinics       RECOMMENDATIONS:     --Patient is to take all scheduled medications on the day of surgery EXCEPT for modifications listed below.    APPROVAL GIVEN to proceed with proposed procedure, without further diagnostic evaluation     1. Seven days before surgery do not  take Aspirin or any over-the-counter pain medications other than Tylenol.  TYLENOL is the safest pain pill to use before surgery because it does not affect your bleeding time. If tylenol is not sufficient for pain control talk to me or the surgeon and we will decide what is safe to use.    2. Do not eat anything after midnight  (melissa of the surgery) and nothing the morning of the surgery.    3. Medications: Can take bedtime medication as usual.     4. Follow all instructions given by the surgery team. They usually give out a packet. Read it and please follow it precisely. This helps surgical experience and outcomes.    5. If you have any questions do not hesitate to call me or the surgeon/surgical team.    Anita Gomes PA-C  [facility]      Signed Electronically by: Anita Gomes PA-C    Copy of this evaluation report is provided to requesting physician.    [place] Preop Guidelines    Revised Cardiac Risk Index

## 2020-03-03 NOTE — PROGRESS NOTES
[facility]  [address] [place] [address]  [address]  [facility] [address]  [phone]  Dept: [phone]    PRE-OP EVALUATION:  Today's date: 3/3/2020    Momo Burns (: 1960) presents for pre-operative evaluation assessment as requested by Dr. Almanza.  He requires evaluation and anesthesia risk assessment prior to undergoing surgery/procedure for treatment of left knee cartilage deterioration.    Proposed Surgery/ Procedure: Left knee replacement  Date of Surgery/ Procedure: 3/11/20   Time of Surgery/ Procedure: TBD-will be in the afternoon time  Hospital/Surgical Facility: [facility]   Will give to [facility] at [facility]  Primary Physician: Anita Gomes  Type of Anesthesia Anticipated: General    Patient has a Health Care Directive or Living Will:  NO    1. NO - Do you have a history of heart attack, stroke, stent, bypass or surgery on an artery in the head, neck, heart or legs?  2. NO - Do you ever have any pain or discomfort in your chest?  3. NO - Do you have a history of  Heart Failure?  4. NO - Are you troubled by shortness of breath when: walking on the level, up a slight hill or at night?  5. NO - Do you currently have a cold, bronchitis or other respiratory infection?  6. NO - Do you have a cough, shortness of breath or wheezing?  7. NO - Do you sometimes get pains in the calves of your legs when you walk?  8. NO - Do you or anyone in your family have previous history of blood clots?  9. NO - Do you or does anyone in your family have a serious bleeding problem such as prolonged bleeding following surgeries or cuts?  10. NO - Have you ever had problems with anemia or been told to take iron pills?  11. NO - Have you had any abnormal blood loss such as black, tarry or bloody stools, or abnormal vaginal bleeding?  12. NO - Have you ever had a blood transfusion?  13. NO - Have you or any of your relatives ever had problems with anesthesia?  14. NO - Do you have  sleep apnea, excessive snoring or daytime drowsiness?  15. NO - Do you have any prosthetic heart valves?  16. NO - Do you have prosthetic joints?  17. NO - Is there any chance that you may be pregnant?      HPI:     HPI related to upcoming procedure: Had bone chip surgery in approximately 1983 where piece of bone had to be reattached from fracture. 3 years ago, Kiran developed significant left knee pain without any additional injury. Based on x-ray and x-ray, plan is for partial or possible total left knee replacement.     HYPERLIPIDEMIA - Patient has a long history of significant Hyperlipidemia requiring medication for treatment with recent good control. Patient reports no problems or side effects with the medication.     MEDICAL HISTORY:     Patient Active Problem List    Diagnosis Date Noted     ACP (advance care planning) 10/07/2015     Priority: Medium     Advance Care Planning 10/7/2015: ACP Review and Resources Provided:  Reviewed chart for advance care plan.  Momo AVILES Burns has no plan or code status on file. Discussed available resources and provided with information. Confirmed code status reflects current choices pending further ACP discussions.  Confirmed/documented legally designated decision maker(s). Added by Stephenie Reyes             Family history of congenital heart disease/marfans 12/01/2010     Priority: Medium     Pure hypercholesterolemia 12/10/2004     Priority: Medium     Mitral valve prolapse 12/01/2004     Priority: Medium     Confirmed  Via echo [address] [place] [address] 09/19/2012     Priority: Low     State Tier Level:  Tier 1  Status:  n/a  Care Coordinator:      See Letters for [facility] Care Plan            Past Medical History:   Diagnosis Date     Tendinitis of hand      Past Surgical History:   Procedure Laterality Date     carpal tunel release  2016    right     HC KNEE SCOPE, DIAGNOSTIC  age 24    Arthroscopy, Knee/ , bone chip, ?dessicans      HC VASECTOMY UNILAT/BILAT W POSTOP  "SEMEN  1999    Vasectomy     RELEASE TRIGGER FINGER  2016    3rd digit of left hand     trigger finger surgery left thumb  2015     Current Outpatient Medications   Medication Sig Dispense Refill     simvastatin (ZOCOR) 20 MG tablet Take 1 tablet (20 mg) by mouth At Bedtime 90 tablet 3     OTC products: Takes ibuprofen, but will stop 7 days before and use Tylenol.     Allergies   Allergen Reactions     No Known Drug Allergies       Latex Allergy: NO    Social History     Tobacco Use     Smoking status: Former Smoker     Packs/day: 0.20     Years: 3.00     Pack years: 0.60     Types: Cigarettes     Smokeless tobacco: Never Used     Tobacco comment: teenage years.   Substance Use Topics     Alcohol use: Yes     Alcohol/week: 3.0 - 6.0 standard drinks     Types: 3 - 6 Standard drinks or equivalent per week     Frequency: 4 or more times a week     Drinks per session: 1 or 2     Binge frequency: Never     History   Drug Use Unknown       REVIEW OF SYSTEMS:   Constitutional, neuro, ENT, endocrine, pulmonary, cardiac, gastrointestinal, genitourinary, musculoskeletal, integument and psychiatric systems are negative, except as otherwise noted.    EXAM:   /62 (BP Location: Right arm, Patient Position: Sitting, Cuff Size: Adult Regular)   Pulse 76   Resp 20   Ht 1.715 m (5' 7.5\")   Wt 79.4 kg (175 lb)   SpO2 99%   BMI 27.00 kg/m      GENERAL APPEARANCE: healthy, alert and no distress     EYES: EOMI,  PERRL     HENT: ear canals and TM's normal and nose and mouth without ulcers or lesions     NECK: no adenopathy, no asymmetry, masses, or scars and thyroid normal to palpation     RESP: lungs clear to auscultation - no rales, rhonchi or wheezes     CV: regular rates and rhythm, normal S1 S2, no S3 or S4 and no murmur, click or rub     ABDOMEN:  soft, nontender, no HSM or masses and bowel sounds normal     MS: extremities normal- no gross deformities noted, no evidence of inflammation in joints, FROM in all " extremities.     SKIN: no suspicious lesions or rashes     NEURO: Normal strength and tone, sensory exam grossly normal, mentation intact and speech normal     PSYCH: mentation appears normal. and affect normal/bright     LYMPHATICS: No cervical adenopathy    DIAGNOSTICS:   EKG: appears normal, NSR, normal axis, normal intervals, no acute ST/T changes c/w ischemia, no LVH by voltage criteria, unchanged from previous tracings  Hemoglobin (indicated for history of anemia or procedure with significant blood loss such as tonsillectomy, major intraperitoneal surgery, vascular surgery, major spine surgery, total joint replacement)  Serum Potassium  Serum Creatinine    Hospital Outpatient Visit on 03/03/2020   Component Date Value Ref Range Status     Specimen Description 03/03/2020 Nares   Final     Methicillin Resist/Sens S. aureus * 03/03/2020 Negative  NEG^Negative Final    Comment: MRSA Negative: SA Negative  MRSA and Staphylococcus aureus target DNA not   detected, presumed negative for MRSA and SA colonization or the number of   bacteria present may be below the limit of detection for the assay. FDA   approved assay performed using [employer] GeneXpert(R) real-time PCR.     Office Visit on 03/03/2020   Component Date Value Ref Range Status     WBC 03/03/2020 5.0  4.0 - 11 10*9/L Final     RBC Count 03/03/2020 4.63  4.4 - 5.9 10*12/L Final     Hemoglobin 03/03/2020 14.2  13.3 - 17.7 g/dL Final     Hematocrit 03/03/2020 41.4  40.0 - 53.0 % Final     MCV 03/03/2020 89.5  78 - 100 fL Final     MCH 03/03/2020 30.7  26 - 33 pg Final     MCHC 03/03/2020 34.3  31 - 36 g/dL Final     RDW 03/03/2020 13.4  % Final     Platelet Count 03/03/2020 245  150 - 375 10^9/L Final     Carbon Dioxide 03/03/2020 30.9  20 - 32 mmol/L Final     Creatinine 03/03/2020 0.86  0.70 - 1.18 mg/dL Final     Glucose 03/03/2020 86  60 - 99 mg/dL Final     Sodium 03/03/2020 144.5  135 - 146 mmol/L Final     Potassium 03/03/2020 4.50  3.5 - 5.3 mmol/L  Final     Chloride 03/03/2020 104.7  98 - 110 mmol/L Final     Protein Total 03/03/2020 7.2  6.1 - 8.1 g/dL Final     Albumin 03/03/2020 4.5  3.6 - 5.1 g/dL Final     Alkaline Phosphatase 03/03/2020 57  33 - 130 U/L Final     ALT 03/03/2020 8  0 - 32 U/L Final     AST 03/03/2020 11  0 - 35 U/L Final     Bilirubin Total 03/03/2020 1.0  0.2 - 1.2 mg/dL Final     Urea Nitrogen 03/03/2020 15  7 - 25 mg/dL Final     Calcium 03/03/2020 9.4  8.6 - 10.3 mg/dL Final     BUN/Creatinine Ratio 03/03/2020 17.4  6 - 22 Final     Globulin Calculated 03/03/2020 2.7  1.9 - 3.7 Final     A/G Ratio 03/03/2020 1.7  1 - 2.5 Final       IMPRESSION:   Reason for surgery/procedure: Left knee pain, arthritis  Diagnosis/reason for consult: Pre-operative Examination    The proposed surgical procedure is considered INTERMEDIATE risk.    REVISED CARDIAC RISK INDEX  The patient has the following serious cardiovascular risks for perioperative complications such as (MI, PE, VFib and 3  AV Block):  No serious cardiac risks  INTERPRETATION: 1 risks: Class II (low risk - 0.9% complication rate)    The patient has the following additional risks for perioperative complications:  No identified additional risks      ICD-10-CM    1. Pre-op evaluation  Z01.818    2. Chronic pain of left knee  M25.562 VENOUS COLLECTION    G89.29 HEMOGRAM/PLATELET (BFP)     Comprehensive Metobolic Panel (BFP)     EKG 12-lead complete w/read - Clinics   3. Pure hypercholesterolemia  E78.00 EKG 12-lead complete w/read - Clinics       RECOMMENDATIONS:     --Patient is to take all scheduled medications on the day of surgery EXCEPT for modifications listed below.    APPROVAL GIVEN to proceed with proposed procedure, without further diagnostic evaluation     1. Seven days before surgery do not take Aspirin or any over-the-counter pain medications other than Tylenol.  TYLENOL is the safest pain pill to use before surgery because it does not affect your bleeding time. If tylenol is  not sufficient for pain control talk to me or the surgeon and we will decide what is safe to use.    2. Do not eat anything after midnight  (melissa of the surgery) and nothing the morning of the surgery.    3. Medications: Can take bedtime medication as usual.     4. Follow all instructions given by the surgery team. They usually give out a packet. Read it and please follow it precisely. This helps surgical experience and outcomes.    5. If you have any questions do not hesitate to call me or the surgeon/surgical team.    Anita Gomes PA-C  [facility]      Signed Electronically by: Anita Gomes PA-C    Copy of this evaluation report is provided to requesting physician.    [facility] Preop Guidelines    Revised Cardiac Risk Index

## 2020-03-03 NOTE — NURSING NOTE
Chief Complaint   Patient presents with     Pre-Op Exam     DOS 3/11, Dr. Almanza, [facility], [facility]

## 2020-03-23 ENCOUNTER — TRANSFERRED RECORDS (OUTPATIENT)
Dept: FAMILY MEDICINE | Facility: CLINIC | Age: 60
End: 2020-03-23

## 2020-04-28 ENCOUNTER — TRANSFERRED RECORDS (OUTPATIENT)
Dept: FAMILY MEDICINE | Facility: CLINIC | Age: 60
End: 2020-04-28

## 2020-06-12 ENCOUNTER — OFFICE VISIT (OUTPATIENT)
Dept: FAMILY MEDICINE | Facility: CLINIC | Age: 60
End: 2020-06-12

## 2020-06-12 VITALS
HEIGHT: 68 IN | SYSTOLIC BLOOD PRESSURE: 120 MMHG | TEMPERATURE: 98.2 F | DIASTOLIC BLOOD PRESSURE: 80 MMHG | OXYGEN SATURATION: 97 % | HEART RATE: 64 BPM | BODY MASS INDEX: 26.89 KG/M2 | WEIGHT: 177.4 LBS

## 2020-06-12 DIAGNOSIS — Z00.00 ENCOUNTER FOR GENERAL MEDICAL EXAMINATION: Primary | ICD-10-CM

## 2020-06-12 DIAGNOSIS — E78.00 PURE HYPERCHOLESTEROLEMIA: ICD-10-CM

## 2020-06-12 LAB
ALBUMIN SERPL-MCNC: 4.5 G/DL (ref 3.6–5.1)
ALBUMIN/GLOB SERPL: 1.7 {RATIO} (ref 1–2.5)
ALP SERPL-CCNC: 71 U/L (ref 33–130)
ALT 1742-6: 12 U/L (ref 0–32)
AST 1920-8: 15 U/L (ref 0–35)
BILIRUB SERPL-MCNC: 0.7 MG/DL (ref 0.2–1.2)
BUN SERPL-MCNC: 12 MG/DL (ref 7–25)
BUN/CREATININE RATIO: 14.7 (ref 6–22)
CALCIUM SERPL-MCNC: 9 MG/DL (ref 8.6–10.3)
CHLORIDE SERPLBLD-SCNC: 104.8 MMOL/L (ref 98–110)
CHOLEST SERPL-MCNC: 190 MG/DL (ref 0–199)
CHOLEST/HDLC SERPL: 2 {RATIO} (ref 0–5)
CO2 SERPL-SCNC: 29.1 MMOL/L (ref 20–32)
CREAT SERPL-MCNC: 0.81 MG/DL (ref 0.7–1.18)
GLOBULIN, CALCULATED - QUEST: 2.6 (ref 1.9–3.7)
GLUCOSE SERPL-MCNC: 102 MG/DL (ref 60–99)
HDLC SERPL-MCNC: 79 MG/DL (ref 40–150)
LDLC SERPL CALC-MCNC: 96 MG/DL (ref 0–130)
POTASSIUM SERPL-SCNC: 4.73 MMOL/L (ref 3.5–5.3)
PROT SERPL-MCNC: 7.1 G/DL (ref 6.1–8.1)
SODIUM SERPL-SCNC: 140.7 MMOL/L (ref 135–146)
TRIGL SERPL-MCNC: 73 MG/DL (ref 0–149)

## 2020-06-12 PROCEDURE — 80061 LIPID PANEL: CPT | Performed by: PHYSICIAN ASSISTANT

## 2020-06-12 PROCEDURE — 99396 PREV VISIT EST AGE 40-64: CPT | Performed by: PHYSICIAN ASSISTANT

## 2020-06-12 PROCEDURE — 36415 COLL VENOUS BLD VENIPUNCTURE: CPT | Performed by: PHYSICIAN ASSISTANT

## 2020-06-12 PROCEDURE — 80053 COMPREHEN METABOLIC PANEL: CPT | Performed by: PHYSICIAN ASSISTANT

## 2020-06-12 RX ORDER — SIMVASTATIN 20 MG
20 TABLET ORAL AT BEDTIME
Qty: 90 TABLET | Refills: 3 | Status: SHIPPED | OUTPATIENT
Start: 2020-06-12 | End: 2021-07-07

## 2020-06-12 ASSESSMENT — MIFFLIN-ST. JEOR: SCORE: 1595.21

## 2020-06-12 NOTE — NURSING NOTE
Kiran is here for CPX fasting    Pre-visit Screening:  Immunizations:  up to date  Colonoscopy:  is up to date  Mammogram: NA  Asthma Action Test/Plan:  NA  PHQ9:  None  GAD7:  None  Questioned patient about current smoking habits Pt. quit smoking some time ago.  Ok to leave detailed message on voice mail for today's visit only Yes, phone # [phone]

## 2020-06-12 NOTE — PROGRESS NOTES
Momo Burns is a 60 year old male presents for routine health maintenance.    Current concerns: Needs refills of simvastatin. No side effects.     Body mass index is 27.17 kg/m .    Present exercise habits:  Works a lot, hiking  Present dietary habits:  eats regular meals and follows a balanced nutrition diet    Vit D intake: is not taking supplement    Is the patient a smoker? No  If yes, smoking cessation advised and counseling provided.     Cardiovascular risk factors: previous smoker and lipids    Over the past few weeks, have you felt down or depressed? Little interest or pleasure in doing things? No concerns    Last dental appointment:  last year  Last optical appointment:  last year    Was the patient born between [birth year] and has not had Hep C testing?  Patient has already been tested    I have reviewed the following histories: Past Medical History, Past Surgical History, Social History, Family History, Problem List, Medication List and Allergies    Past Medical History:   Diagnosis Date     Tendinitis of hand      Family History   Problem Relation Age of Onset     Respiratory Mother         copd  age 77     Neurologic Disorder Father         cva, heavy smoker , from disease     Cardiovascular Sister         marfans, aortic valve repair, 2 valves,      Genetic Disorder Sister         marfans     Breast Cancer Sister         40s     Bladder Cancer Brother         age 60      Colon Cancer No family hx of      Prostate Cancer No family hx of      Social History     Socioeconomic History     Marital status: Single     Spouse name: Not on file     Number of children: Not on file     Years of education: Not on file     Highest education level: Not on file   Occupational History     Occupation: manager     Employer: [employer]     Comment:     Social Needs     Financial resource strain: Not on file     Food insecurity     Worry: Not on file     Inability: Not on file      Transportation needs     Medical: Not on file     Non-medical: Not on file   Tobacco Use     Smoking status: Former Smoker     Packs/day: 0.20     Years: 3.00     Pack years: 0.60     Types: Cigarettes     Smokeless tobacco: Never Used     Tobacco comment: teenage years.   Substance and Sexual Activity     Alcohol use: Yes     Alcohol/week: 3.0 - 6.0 standard drinks     Types: 3 - 6 Standard drinks or equivalent per week     Frequency: 4 or more times a week     Drinks per session: 1 or 2     Binge frequency: Never     Drug use: Not Currently     Types: Marijuana     Sexual activity: Not Currently   Lifestyle     Physical activity     Days per week: Not on file     Minutes per session: Not on file     Stress: Not on file   Relationships     Social connections     Talks on phone: Not on file     Gets together: Not on file     Attends Denominational service: Not on file     Active member of club or organization: Not on file     Attends meetings of clubs or organizations: Not on file     Relationship status: Not on file     Intimate partner violence     Fear of current or ex partner: Not on file     Emotionally abused: Not on file     Physically abused: Not on file     Forced sexual activity: Not on file   Other Topics Concern      Service No     Blood Transfusions No     Caffeine Concern Not Asked     Occupational Exposure No     Hobby Hazards No     Sleep Concern No     Stress Concern No     Weight Concern No     Special Diet No     Back Care No     Exercise Yes     Comment: [employer] [place],      Bike Helmet Not Asked     Seat Belt Yes     Self-Exams No   Social History Narrative     Not on file     Patient Active Problem List   Diagnosis     Mitral valve prolapse     Pure hypercholesterolemia     Family history of congenital heart disease/marfans     Health Care Home     ACP (advance care planning)     Current Outpatient Medications   Medication     simvastatin (ZOCOR) 20 MG tablet     No current facility-administered  "medications for this visit.        Allergies:  Allergies   Allergen Reactions     No Known Drug Allergies          ROS:  E/M: NEGATIVE for ear, nose, mouth and throat problems  R: NEGATIVE for significant/chronic cough or SOB  CV: NEGATIVE for chest pain or palpitations  GI: NEGATIVE for abdominal pain, chronic diarrhea or constipation  : NEGATIVE for dysuria, hematuria, weakened urinary stream      OBJECTIVE:    Vitals:    06/12/20 0808   BP: 120/80   BP Location: Right arm   Patient Position: Sitting   Cuff Size: Adult Large   Pulse: 64   Temp: 98.2  F (36.8  C)   TempSrc: Oral   SpO2: 97%   Weight: 80.5 kg (177 lb 6.4 oz)   Height: 1.721 m (5' 7.75\")       General: 60 year old male who appears his stated age. Vital signs noted.  Head: Normocephalic  Eyes: pupils equal round reactive to light and accomodation, extra ocular movements intact  Ears: external canals and tms free of abnormalities  Nose: patent, without mucosal abnormalities  Mouth and throat: without erythema or lesions of the mucosa  Neck: supple, without adenopathy or thyromegaly  Lungs: clear to auscultation, no wheezing or crackles  Cv: regular rate and rhythm, normal s1 and s2 without murmur or click  Abd: soft, non-tender, no masses, no hepatomegaly or splenomegaly.  Gu: normal external genitalia, no hernia  Rectal: Not indicated  Ms: normal muscle tone & symmetry  Skin: Clear to inspection  Neuro: sensation and motor function grossly intact; cranial nerves without obvious abnormalities.        ASSESSMENT/PLAN:    1. Encounter for general medical examination  Kiran is doing well today. [facility] fasting labs[facility]. Ref[facility] for 1 year, but he can come in sooner with any concerns.     2. Pure hypercholesterolemia  - simvastatin (ZOCOR) 20 MG tablet; Take 1 tablet (20 mg) by mouth At Bedtime  Dispense: 90 tablet; Refill: 3     reports that he has quit smoking. His smoking use included cigarettes. He has a 0.60 pack-year " "smoking history. He has never used smokeless tobacco.    Estimated body mass index is 27.17 kg/m  as calculated from the following:    Height as of this encounter: 1.721 m (5' 7.75\").    Weight as of this encounter: 80.5 kg (177 lb 6.4 oz).  Weight management plan: Discussed healthy diet and exercise guidelines    Labs pending:      Fasting glucose      Fasting lipids  Meds Suggested:      Vitamin D       Calcium  Tests Recommended:      Regular Dental Examinations        Eye exam  Behavior Modifications:       Cardiovascular exercise 3 times per week--enough to get your Target Heart rate  Other recommendations:     BMI noted and discussed      Regular testicle exam     Encouraged My Chart    The patient will return to the clinic if symptoms are changing or concern with follow up as discussed. The patient understands and agrees with the plan.      Anita Gomes PA-C  6/12/2020      Counseling Resources:  ATP IV Guidelines  Pooled Cohorts Equation Calculator  Breast Cancer Risk Calculator  FRAX Risk Assessment  ICSI Preventive Guidelines  Dietary Guidelines for Americans, 2010  [employer] MyPlate    "

## 2021-01-09 ENCOUNTER — HEALTH MAINTENANCE LETTER (OUTPATIENT)
Age: 61
End: 2021-01-09

## 2021-06-28 NOTE — TELEPHONE ENCOUNTER
Denied refill request. Pt is due for annual fasting office visit.  Will send a [employer] message to inform pt.

## 2021-07-07 ENCOUNTER — OFFICE VISIT (OUTPATIENT)
Dept: FAMILY MEDICINE | Facility: CLINIC | Age: 61
End: 2021-07-07

## 2021-07-07 VITALS
BODY MASS INDEX: 26.52 KG/M2 | TEMPERATURE: 97.9 F | SYSTOLIC BLOOD PRESSURE: 120 MMHG | HEIGHT: 68 IN | OXYGEN SATURATION: 98 % | HEART RATE: 66 BPM | DIASTOLIC BLOOD PRESSURE: 74 MMHG | WEIGHT: 175 LBS

## 2021-07-07 DIAGNOSIS — E78.00 PURE HYPERCHOLESTEROLEMIA: ICD-10-CM

## 2021-07-07 DIAGNOSIS — Z00.00 ENCOUNTER FOR GENERAL MEDICAL EXAMINATION: Primary | ICD-10-CM

## 2021-07-07 DIAGNOSIS — Z13.228 SCREENING FOR METABOLIC DISORDER: ICD-10-CM

## 2021-07-07 LAB
ALBUMIN SERPL-MCNC: 4.6 G/DL (ref 3.6–5.1)
ALBUMIN/GLOB SERPL: 1.8 {RATIO} (ref 1–2.5)
ALP SERPL-CCNC: 62 U/L (ref 33–130)
ALT 1742-6: 19 U/L (ref 0–32)
AST 1920-8: 16 U/L (ref 0–35)
BILIRUB SERPL-MCNC: 1 MG/DL (ref 0.2–1.2)
BUN SERPL-MCNC: 12 MG/DL (ref 7–25)
BUN/CREATININE RATIO: 15.2 (ref 6–22)
CALCIUM SERPL-MCNC: 8.9 MG/DL (ref 8.6–10.3)
CHLORIDE SERPLBLD-SCNC: 104.5 MMOL/L (ref 98–110)
CHOLEST SERPL-MCNC: 201 MG/DL (ref 0–199)
CHOLEST/HDLC SERPL: 2 {RATIO} (ref 0–5)
CO2 SERPL-SCNC: 27.8 MMOL/L (ref 20–32)
CREAT SERPL-MCNC: 0.79 MG/DL (ref 0.6–1.3)
GLOBULIN, CALCULATED - QUEST: 2.6 (ref 1.9–3.7)
GLUCOSE SERPL-MCNC: 107 MG/DL (ref 60–99)
HDLC SERPL-MCNC: 81 MG/DL (ref 40–150)
LDLC SERPL CALC-MCNC: 107 MG/DL (ref 0–130)
POTASSIUM SERPL-SCNC: 4.91 MMOL/L (ref 3.5–5.3)
PROT SERPL-MCNC: 7.2 G/DL (ref 6.1–8.1)
SODIUM SERPL-SCNC: 138.2 MMOL/L (ref 135–146)
TRIGL SERPL-MCNC: 66 MG/DL (ref 0–149)

## 2021-07-07 PROCEDURE — 99396 PREV VISIT EST AGE 40-64: CPT | Performed by: PHYSICIAN ASSISTANT

## 2021-07-07 PROCEDURE — 80053 COMPREHEN METABOLIC PANEL: CPT | Performed by: PHYSICIAN ASSISTANT

## 2021-07-07 PROCEDURE — 36415 COLL VENOUS BLD VENIPUNCTURE: CPT | Performed by: PHYSICIAN ASSISTANT

## 2021-07-07 PROCEDURE — 80061 LIPID PANEL: CPT | Performed by: PHYSICIAN ASSISTANT

## 2021-07-07 RX ORDER — SIMVASTATIN 20 MG
20 TABLET ORAL AT BEDTIME
Qty: 90 TABLET | Refills: 3 | Status: SHIPPED | OUTPATIENT
Start: 2021-07-07 | End: 2022-08-03

## 2021-07-07 ASSESSMENT — MIFFLIN-ST. JEOR: SCORE: 1575.35

## 2021-07-07 NOTE — PROGRESS NOTES
Momo Burns is a 61 year old male presents for routine health maintenance.    Current concerns: Needs refills of simvastatin. Taking regularly. No side effects.     Body mass index is 27 kg/m .    Present exercise habits:  3-5 times/week  Present dietary habits:  eats regular meals and follows a balanced nutrition diet    Vit D intake: is not taking supplement    Is the patient a smoker? No  If yes, smoking cessation advised and counseling provided.     Cardiovascular risk factors: lipids    Over the past few weeks, have you felt down or depressed? Little interest or pleasure in doing things? No concerns    Last dental appointment:  this year  Last optical appointment:  this year    Was the patient born between [birth year] and has not had Hep C testing?  Patient has already been tested    I have reviewed the following histories: Past Medical History, Past Surgical History, Social History, Family History, Problem List, Medication List and Allergies    Past Medical History:   Diagnosis Date     Tendinitis of hand      Family History   Problem Relation Age of Onset     Respiratory Mother         copd  age 77     Neurologic Disorder Father         cva, heavy smoker , from disease     Cardiovascular Sister         marfans, aortic valve repair, 2 valves,      Genetic Disorder Sister         marfans     Breast Cancer Sister         40s     Bladder Cancer Brother         age 60      Colon Cancer No family hx of      Prostate Cancer No family hx of      Social History     Socioeconomic History     Marital status: Single     Spouse name: Not on file     Number of children: Not on file     Years of education: Not on file     Highest education level: Not on file   Occupational History     Occupation: manager     Employer: [employer]     Comment:     Social Needs     Financial resource strain: Not on file     Food insecurity     Worry: Not on file     Inability: Not on file     Transportation needs      Medical: Not on file     Non-medical: Not on file   Tobacco Use     Smoking status: Former Smoker     Packs/day: 0.20     Years: 3.00     Pack years: 0.60     Types: Cigarettes     Smokeless tobacco: Never Used     Tobacco comment: teenage years.   Substance and Sexual Activity     Alcohol use: Yes     Alcohol/week: 3.0 - 6.0 standard drinks     Types: 3 - 6 Standard drinks or equivalent per week     Frequency: 4 or more times a week     Drinks per session: 1 or 2     Binge frequency: Never     Drug use: Not Currently     Types: Marijuana     Sexual activity: Not Currently   Lifestyle     Physical activity     Days per week: Not on file     Minutes per session: Not on file     Stress: Not on file   Relationships     Social connections     Talks on phone: Not on file     Gets together: Not on file     Attends Jewish service: Not on file     Active member of club or organization: Not on file     Attends meetings of clubs or organizations: Not on file     Relationship status: Not on file     Intimate partner violence     Fear of current or ex partner: Not on file     Emotionally abused: Not on file     Physically abused: Not on file     Forced sexual activity: Not on file   Other Topics Concern      Service No     Blood Transfusions No     Caffeine Concern Not Asked     Occupational Exposure No     Hobby Hazards No     Sleep Concern No     Stress Concern No     Weight Concern No     Special Diet No     Back Care No     Exercise Yes     Comment: [employer] [place],      Bike Helmet Not Asked     Seat Belt Yes     Self-Exams No   Social History Narrative     Not on file     Patient Active Problem List   Diagnosis     Mitral valve prolapse     Pure hypercholesterolemia     Family history of congenital heart disease/marfans     Health Care Home     ACP (advance care planning)     Current Outpatient Medications   Medication     simvastatin (ZOCOR) 20 MG tablet     No current facility-administered medications for this  "visit.        Allergies:  Allergies   Allergen Reactions     No Known Drug Allergies      ROS:  E/M: NEGATIVE for ear, nose, mouth and throat problems  R: NEGATIVE for significant/chronic cough or SOB  CV: NEGATIVE for chest pain or palpitations  GI: NEGATIVE for abdominal pain, chronic diarrhea or constipation  : NEGATIVE for dysuria, hematuria, weakened urinary stream      OBJECTIVE:    Vitals:    07/07/21 0919   BP: 120/74   BP Location: Left arm   Patient Position: Sitting   Cuff Size: Adult Large   Pulse: 66   Temp: 97.9  F (36.6  C)   TempSrc: Temporal   SpO2: 98%   Weight: 79.4 kg (175 lb)   Height: 1.715 m (5' 7.5\")       General: 61 year old male who appears his stated age. Vital signs noted.  Head: Normocephalic  Eyes: pupils equal round reactive to light and accomodation, extra ocular movements intact  Ears: external canals and tms free of abnormalities  Nose: patent, without mucosal abnormalities  Mouth and throat: without erythema or lesions of the mucosa  Neck: supple, without adenopathy or thyromegaly  Lungs: clear to auscultation, no wheezing or crackles  Cv: regular rate and rhythm, normal s1 and s2 without murmur or click  Abd: soft, non-tender, no masses, no hepatomegaly or splenomegaly.  Gu: normal external genitalia, no hernia  Rectal: no masses, prostate normal size and density.  Ms: normal muscle tone & symmetry  Skin: Clear to inspection  Neuro: sensation and motor function grossly intact; cranial nerves without obvious abnormalities.      ASSESSMENT/PLAN:    1. Encounter for general medical examination  Kiran is doing well today. Will update fasting labs and send [facility] results. Will refill simvastatin for 1 year without change. Discussed PSA, but agreed he does not have any concerns, FH.    2. Pure hypercholesterolemia  - simvastatin (ZOCOR) 20 MG tablet; Take 1 tablet (20 mg) by mouth At Bedtime  Dispense: 90 tablet; Refill: 3  - VENOUS COLLECTION  - Lipid Panel (BFP)    3. Screening " "for metabolic disorder  - VENOUS COLLECTION  - Comprehensive Metobolic Panel (BFP)     reports that he has quit smoking. His smoking use included cigarettes. He has a 0.60 pack-year smoking history. He has never used smokeless tobacco.      Estimated body mass index is 27 kg/m  as calculated from the following:    Height as of this encounter: 1.715 m (5' 7.5\").    Weight as of this encounter: 79.4 kg (175 lb).  Weight management plan: Discussed healthy diet and exercise guidelines      Labs pending:      Fasting glucose      Fasting lipids  Meds Suggested:      Vitamin D       Calcium  Tests Recommended:      Regular Dental Examinations        Eye exam  Behavior Modifications:       Cardiovascular exercise 3 times per week--enough to get your Target Heart rate  Other recommendations:    Health Care directive     BMI noted and discussed      Regular testicle exam     Encouraged My Chart    The patient will return to the clinic if symptoms are changing or concern with follow up as discussed. The patient understands and agrees with the plan.      Anita Madsen PA-C  7/7/2021          Counseling Resources:  ATP IV Guidelines  Pooled Cohorts Equation Calculator  Breast Cancer Risk Calculator  FRAX Risk Assessment  ICSI Preventive Guidelines  Dietary Guidelines for Americans, 2010  [employer]'s MyPlate    "

## 2021-07-07 NOTE — NURSING NOTE
Chief Complaint   Patient presents with     Physical     fasting       Pre-visit Screening:  Immunizations:  up to date  Colonoscopy:  is up to date  Mammogram: NA  Asthma Action Test/Plan:  NA  PHQ9:  NA  GAD7:  NA  Questioned patient about current smoking habits Pt. quit smoking some time ago.  Ok to leave detailed message on voice mail for today's visit only Yes, phone # cell

## 2021-09-30 ENCOUNTER — DOCUMENTATION ONLY (OUTPATIENT)
Dept: OTHER | Facility: CLINIC | Age: 61
End: 2021-09-30

## 2021-10-23 ENCOUNTER — HEALTH MAINTENANCE LETTER (OUTPATIENT)
Age: 61
End: 2021-10-23

## 2022-08-01 NOTE — PROGRESS NOTES
SUBJECTIVE:   CC: Momo Burns is an 62 year old male who presents for preventative health visit.       Patient has been advised of split billing requirements and indicates understanding: Yes  HPI        Hyperlipidemia Follow-Up      Are you regularly taking any medication or supplement to lower your cholesterol?   Yes- simvastatin    Are you having muscle aches or other side effects that you think could be caused by your cholesterol lowering medication?  No      Today's PHQ-2 Score:   PHQ-2 ( 1999 Pfizer) 8/3/2022   Q1: Little interest or pleasure in doing things 0   Q2: Feeling down, depressed or hopeless 0   PHQ-2 Score 0   PHQ-2 Total Score (12-17 Years)- Positive if 3 or more points; Administer PHQ-A if positive -       Abuse: Current or Past(Physical, Sexual or Emotional)- No  Do you feel safe in your environment? Yes      Diet-recent weight loss. Stable diet-good fruit/vegetable intake. Good protein intake.   Exercise-active-lots of hiking and active with job  Sleep-8hrs/night  BM-1/day  Vitamin Use-none  Dentist-yearly  Eye Doctor-1/year  PSA-decline after discussing risk vs benefits. 2x/night nocturia but no change in this  Colonoscopy-due 2023  Lipid: will do today        Social History     Tobacco Use     Smoking status: Former Smoker     Packs/day: 0.20     Years: 3.00     Pack years: 0.60     Types: Cigarettes     Smokeless tobacco: Never Used     Tobacco comment: teenage years.   Substance Use Topics     Alcohol use: Yes     Alcohol/week: 3.0 - 6.0 standard drinks     Types: 3 - 6 Standard drinks or equivalent per week         No flowsheet data found.    Last PSA:   Abbott PSA   Date Value Ref Range Status   05/14/2019 0.8 < OR = 4.0 ng/mL Final     Comment:     The total PSA value from this assay system is   standardized against the WHO standard. The test   result will be approximately 20% lower when compared   to the equimolar-standardized total PSA (Geovanna   Deyvi). Comparison of serial PSA  results should be   interpreted with this fact in mind.     This test was performed using the Siemens   chemiluminescent method. Values obtained from   different assay methods cannot be used  interchangeably. PSA levels, regardless of  value, should not be interpreted as absolute  evidence of the presence or absence of disease.         Reviewed orders with patient. Reviewed health maintenance and updated orders accordingly - Yes  Lab work is in process    Reviewed and updated as needed this visit by clinical staff   Tobacco  Allergies                 Reviewed and updated as needed this visit by Provider   Tobacco  Allergies                Past Medical History:   Diagnosis Date     Tendinitis of hand       Past Surgical History:   Procedure Laterality Date     carpal tunel release  2016    right     HC KNEE SCOPE, DIAGNOSTIC  age 24    Arthroscopy, Knee/ , bone chip, ?dessicans      HC VASECTOMY UNILAT/BILAT W POSTOP SEMEN  1999    Vasectomy     RELEASE TRIGGER FINGER  2016    3rd digit of left hand     trigger finger surgery left thumb  2015     ZZC TOTAL KNEE ARTHROPLASTY Left 03/11/2020    [facility]       Review of Systems  CONSTITUTIONAL: NEGATIVE for fever, chills, change in weight  INTEGUMENTARY/SKIN: NEGATIVE for worrisome rashes, moles or lesions  EYES: NEGATIVE for vision changes or irritation  ENT: NEGATIVE for ear, mouth and throat problems  RESP: NEGATIVE for significant cough or SOB  CV: NEGATIVE for chest pain, palpitations or peripheral edema  GI: NEGATIVE for nausea, abdominal pain, heartburn, or change in bowel habits   male: negative for dysuria, hematuria, decreased urinary stream, erectile dysfunction, urethral discharge  MUSCULOSKELETAL: NEGATIVE for significant arthralgias or myalgia  NEURO: NEGATIVE for weakness, dizziness or paresthesias  PSYCHIATRIC: NEGATIVE for changes in mood or affect    OBJECTIVE:   /80 (BP Location: Right arm, Patient Position: Sitting, Cuff Size: Adult Large)    "Pulse 68   Temp 98.1  F (36.7  C) (Oral)   Wt 76.7 kg (169 lb)   SpO2 96%   BMI 26.08 kg/m      Physical Exam  GENERAL: healthy, alert and no distress  HENT: impacted cerumen MARY, nose and mouth without ulcers or lesions  NECK: no adenopathy, no asymmetry, masses, or scars and thyroid normal to palpation  RESP: lungs clear to auscultation - no rales, rhonchi or wheezes  CV: regular rate and rhythm, normal S1 S2, no S3 or S4, no murmur, click or rub, no peripheral edema and peripheral pulses strong  ABDOMEN: soft, nontender, no hepatosplenomegaly, no masses and bowel sounds normal  MS: no gross musculoskeletal defects noted, no edema  SKIN: both toenails abnormal, L toenail has some brown coloration, R toenail some white discoloration  NEURO: Normal strength and tone, mentation intact and speech normal  PSYCH: mentation appears normal, affect normal/bright    Diagnostic Test Results:  Labs reviewed in Epic    ASSESSMENT/PLAN:       ICD-10-CM    1. Routine general medical examination at a health care facility  Z00.00 VENOUS COLLECTION     Lipid Panel (BFP)     Comprehensive Metobolic Panel (BFP)   2. Mixed hyperlipidemia  E78.2 VENOUS COLLECTION     Lipid Panel (BFP)     Comprehensive Metobolic Panel (BFP)   3. Pure hypercholesterolemia  E78.00 simvastatin (ZOCOR) 20 MG tablet   4. Bilateral impacted cerumen  H61.23 Removal Impacted Cerumen Irrigation Unilateral (Ear Wash)     After lavage, ears normal MARY  Patient due for colonoscopy 2023, Td shot 2025  Refilled simvastatin for 1 year    RTC 1 year lipid check/wellness visit    COUNSELING:   Reviewed preventive health counseling, as reflected in patient instructions       Regular exercise       Healthy diet/nutrition       Vision screening       Colorectal cancer screening       Prostate cancer screening    Estimated body mass index is 26.08 kg/m  as calculated from the following:    Height as of 7/7/21: 1.715 m (5' 7.5\").    Weight as of this encounter: 76.7 kg " (169 lb).         He reports that he has quit smoking. His smoking use included cigarettes. He has a 0.60 pack-year smoking history. He has never used smokeless tobacco.      Counseling Resources:  ATP IV Guidelines  Pooled Cohorts Equation Calculator  FRAX Risk Assessment  ICSI Preventive Guidelines  Dietary Guidelines for Americans, 2010  USDA's MyPlate  ASA Prophylaxis  Lung CA Screening    Mason Gifford PA-C  [facility]

## 2022-08-03 ENCOUNTER — OFFICE VISIT (OUTPATIENT)
Dept: FAMILY MEDICINE | Facility: CLINIC | Age: 62
End: 2022-08-03

## 2022-08-03 VITALS
DIASTOLIC BLOOD PRESSURE: 80 MMHG | HEART RATE: 68 BPM | SYSTOLIC BLOOD PRESSURE: 122 MMHG | BODY MASS INDEX: 26.08 KG/M2 | WEIGHT: 169 LBS | TEMPERATURE: 98.1 F | OXYGEN SATURATION: 96 %

## 2022-08-03 DIAGNOSIS — Z00.00 ROUTINE GENERAL MEDICAL EXAMINATION AT A HEALTH CARE FACILITY: Primary | ICD-10-CM

## 2022-08-03 DIAGNOSIS — E78.00 PURE HYPERCHOLESTEROLEMIA: ICD-10-CM

## 2022-08-03 DIAGNOSIS — H61.23 BILATERAL IMPACTED CERUMEN: ICD-10-CM

## 2022-08-03 DIAGNOSIS — E78.2 MIXED HYPERLIPIDEMIA: ICD-10-CM

## 2022-08-03 LAB
ALBUMIN SERPL-MCNC: 4.6 G/DL (ref 3.6–5.1)
ALBUMIN/GLOB SERPL: 1.9 {RATIO} (ref 1–2.5)
ALP SERPL-CCNC: 50 U/L (ref 33–130)
ALT 1742-6: 28 U/L (ref 0–32)
AST 1920-8: 22 U/L (ref 0–35)
BILIRUB SERPL-MCNC: 0.9 MG/DL (ref 0.2–1.2)
BUN SERPL-MCNC: 15 MG/DL (ref 7–25)
BUN/CREATININE RATIO: 17.4 (ref 6–22)
CALCIUM SERPL-MCNC: 9.5 MG/DL (ref 8.6–10.3)
CHLORIDE SERPLBLD-SCNC: 105.2 MMOL/L (ref 98–110)
CHOLEST SERPL-MCNC: 186 MG/DL (ref 0–199)
CHOLEST/HDLC SERPL: 3 {RATIO} (ref 0–5)
CO2 SERPL-SCNC: 31.3 MMOL/L (ref 20–32)
CREAT SERPL-MCNC: 0.86 MG/DL (ref 0.6–1.3)
GLOBULIN, CALCULATED - QUEST: 2.4 (ref 1.9–3.7)
GLUCOSE SERPL-MCNC: 93 MG/DL (ref 60–99)
HDLC SERPL-MCNC: 70 MG/DL (ref 40–150)
LDLC SERPL CALC-MCNC: 102 MG/DL (ref 0–130)
POTASSIUM SERPL-SCNC: 4.47 MMOL/L (ref 3.5–5.3)
PROT SERPL-MCNC: 7 G/DL (ref 6.1–8.1)
SODIUM SERPL-SCNC: 142.2 MMOL/L (ref 135–146)
TRIGL SERPL-MCNC: 70 MG/DL (ref 0–149)

## 2022-08-03 PROCEDURE — 80061 LIPID PANEL: CPT | Performed by: PHYSICIAN ASSISTANT

## 2022-08-03 PROCEDURE — 69209 REMOVE IMPACTED EAR WAX UNI: CPT | Mod: 50 | Performed by: PHYSICIAN ASSISTANT

## 2022-08-03 PROCEDURE — 99396 PREV VISIT EST AGE 40-64: CPT | Mod: 25 | Performed by: PHYSICIAN ASSISTANT

## 2022-08-03 PROCEDURE — 36415 COLL VENOUS BLD VENIPUNCTURE: CPT | Performed by: PHYSICIAN ASSISTANT

## 2022-08-03 PROCEDURE — 80053 COMPREHEN METABOLIC PANEL: CPT | Performed by: PHYSICIAN ASSISTANT

## 2022-08-03 RX ORDER — SIMVASTATIN 20 MG
20 TABLET ORAL AT BEDTIME
Qty: 90 TABLET | Refills: 3 | Status: SHIPPED | OUTPATIENT
Start: 2022-08-03 | End: 2023-09-07

## 2022-10-09 ENCOUNTER — HEALTH MAINTENANCE LETTER (OUTPATIENT)
Age: 62
End: 2022-10-09

## 2023-01-20 ENCOUNTER — OFFICE VISIT (OUTPATIENT)
Dept: FAMILY MEDICINE | Facility: CLINIC | Age: 63
End: 2023-01-20

## 2023-01-20 VITALS
HEIGHT: 68 IN | WEIGHT: 179 LBS | HEART RATE: 68 BPM | OXYGEN SATURATION: 99 % | DIASTOLIC BLOOD PRESSURE: 70 MMHG | BODY MASS INDEX: 27.13 KG/M2 | SYSTOLIC BLOOD PRESSURE: 118 MMHG | TEMPERATURE: 97.1 F

## 2023-01-20 DIAGNOSIS — E78.2 MIXED HYPERLIPIDEMIA: ICD-10-CM

## 2023-01-20 DIAGNOSIS — Z01.818 PRE-OPERATIVE EXAMINATION: Primary | ICD-10-CM

## 2023-01-20 DIAGNOSIS — M65.321 TRIGGER FINGER, RIGHT INDEX FINGER: ICD-10-CM

## 2023-01-20 DIAGNOSIS — G56.02 CARPAL TUNNEL SYNDROME OF LEFT WRIST: ICD-10-CM

## 2023-01-20 LAB
ALBUMIN SERPL-MCNC: 4.4 G/DL (ref 3.6–5.1)
ALBUMIN/GLOB SERPL: 2 {RATIO} (ref 1–2.5)
ALP SERPL-CCNC: 45 U/L (ref 33–130)
ALT 1742-6: 18 U/L (ref 0–32)
AST 1920-8: 14 U/L (ref 0–35)
BILIRUB SERPL-MCNC: 0.7 MG/DL (ref 0.2–1.2)
BUN SERPL-MCNC: 15 MG/DL (ref 7–25)
BUN/CREATININE RATIO: 14 (ref 6–22)
CALCIUM SERPL-MCNC: 9.3 MG/DL (ref 8.6–10.3)
CHLORIDE SERPLBLD-SCNC: 105.1 MMOL/L (ref 98–110)
CO2 SERPL-SCNC: 31.6 MMOL/L (ref 20–32)
CREAT SERPL-MCNC: 1.07 MG/DL (ref 0.6–1.3)
ERYTHROCYTE [DISTWIDTH] IN BLOOD BY AUTOMATED COUNT: 12.6 %
GLOBULIN, CALCULATED - QUEST: 2.2 (ref 1.9–3.7)
GLUCOSE SERPL-MCNC: 86 MG/DL (ref 60–99)
HCT VFR BLD AUTO: 43.1 % (ref 40–53)
HEMOGLOBIN: 14.2 G/DL (ref 13.3–17.7)
MCH RBC QN AUTO: 30.4 PG (ref 26–33)
MCHC RBC AUTO-ENTMCNC: 32.9 G/DL (ref 31–36)
MCV RBC AUTO: 92.3 FL (ref 78–100)
PLATELET COUNT - QUEST: 236 10^9/L (ref 150–375)
POTASSIUM SERPL-SCNC: 4.67 MMOL/L (ref 3.5–5.3)
PROT SERPL-MCNC: 6.6 G/DL (ref 6.1–8.1)
RBC # BLD AUTO: 4.67 10*12/L (ref 4.4–5.9)
SODIUM SERPL-SCNC: 141.8 MMOL/L (ref 135–146)
WBC # BLD AUTO: 4.2 10*9/L (ref 4–11)

## 2023-01-20 PROCEDURE — 93000 ELECTROCARDIOGRAM COMPLETE: CPT | Performed by: PHYSICIAN ASSISTANT

## 2023-01-20 PROCEDURE — 80053 COMPREHEN METABOLIC PANEL: CPT | Performed by: PHYSICIAN ASSISTANT

## 2023-01-20 PROCEDURE — 36415 COLL VENOUS BLD VENIPUNCTURE: CPT | Performed by: PHYSICIAN ASSISTANT

## 2023-01-20 PROCEDURE — 85027 COMPLETE CBC AUTOMATED: CPT | Performed by: PHYSICIAN ASSISTANT

## 2023-01-20 PROCEDURE — 99213 OFFICE O/P EST LOW 20 MIN: CPT | Mod: 25 | Performed by: PHYSICIAN ASSISTANT

## 2023-01-20 NOTE — PROGRESS NOTES
[facility]  [address]  [address]  [facility] [address]  Phone: [phone]  Fax: [phone]  Primary Provider: Anita Cruz  Pre-op Performing Provider: ANITA CRUZ    PREOPERATIVE EVALUATION:  Today's date: 1/20/2023    Momo Burns is a 62 year old male who presents for a preoperative evaluation.    Surgical Information:  Surgery/Procedure: Left carpal tunnel, Right trigger finger surgery  Surgery Location: [facility]  Surgeon: Dr. Turner  Surgery Date: 1/26/2023  Time of Surgery: AM  Where patient plans to recover: At home with family  Fax number for surgical facility: [phone]    Type of Anesthesia Anticipated: to be determined    Assessment & Plan     The proposed surgical procedure is considered INTERMEDIATE risk.    Pre-operative examination  Trigger finger, right index finger  Carpal tunnel syndrome of left wrist  - EKG 12-lead complete w/read - Clinics  - VENOUS COLLECTION  - Hemogram Platelet (BFP)  - Comprehensive Metobolic Panel (BFP)    Mixed hyperlipidemia    Risks and Recommendations:  The patient has the following additional risks and recommendations for perioperative complications:   - No identified additional risk factors other than previously addressed    Medication Instructions:  Patient is to take all scheduled medications on the day of surgery    RECOMMENDATION:  APPROVAL GIVEN to proceed with proposed procedure, without further diagnostic evaluation.    25 minutes spent on the date of the encounter doing chart review, review of test results, interpretation of tests, patient visit and documentation   {Provider  Link to [facility] Help Grid     Subjective     HPI related to upcoming procedure:   Has been having worsening symptoms of carpal tunnel in left hand for over 1 year. Plan is for release. Also developed trigger finder in right hand 4th digit that will be corrected as well.     1. No - Have you ever had a heart  attack or stroke?  2. No - Have you ever had surgery on your heart or blood vessels, such as a stent, coronary (heart) bypass, or surgery on an artery in the head, neck, heart, or legs?  3. No - Do you have chest pain when you are physically active?  4. No - Do you have a history of heart failure?  5. No - Do you currently have a cold, bronchitis, or symptoms of other respiratory (head and chest) infections?  6. No - Do you have a cough, shortness of breath, or wheezing?  7. Yes - Do you or anyone in your family have a history of blood clots? Father had a stroke in his 70s. He was a smoker.   8. No - Do you or anyone in your family have a serious bleeding problem, such as long-lasting bleeding after surgeries or cuts?  9. No - Have you ever had anemia or been told to take iron pills?  10. No - Have you had any abnormal blood loss such as black, tarry or bloody stools, or abnormal vaginal bleeding?  11. No - Have you ever had a blood transfusion?  12. Yes - Are you willing to have a blood transfusion if it is medically needed before, during, or after your surgery?  13. No - Have you or anyone in your family ever had problems with anesthesia (sedation for surgery)?  14. No - Do you have sleep apnea, excessive snoring, or daytime drowsiness?   15. No - Do you have any artifical heart valves or other implanted medical devices, such as a pacemaker, defibrillator, or continuous glucose monitor?  16. Yes - Do you have any artifical joints? Left TKR 2020.  17. No - Are you allergic to latex?  18. No - Is there any chance that you may be pregnant?    Health Care Directive:  Patient has a Health Care Directive on file    Preoperative Review of :   reviewed - no record of controlled substances prescribed.  {Review MNPMP for all patients per [facility] MNPMP Profile    Status of Chronic Conditions:  HYPERLIPIDEMIA - Patient has a long history of significant Hyperlipidemia requiring medication for treatment with recent good  control. Patient reports no problems or side effects with the medication.     Review of Systems  CONSTITUTIONAL: NEGATIVE for fever, chills, change in weight  INTEGUMENTARY/SKIN: NEGATIVE for worrisome rashes, moles or lesions  EYES: NEGATIVE for vision changes or irritation  ENT/MOUTH: NEGATIVE for ear, mouth and throat problems  RESP: NEGATIVE for significant cough or SOB  CV: NEGATIVE for chest pain, palpitations or peripheral edema  GI: NEGATIVE for nausea, abdominal pain, heartburn, or change in bowel habits  : NEGATIVE for frequency, dysuria, or hematuria  MUSCULOSKELETAL: NEGATIVE for significant arthralgias or myalgia  NEURO: NEGATIVE for weakness, dizziness or paresthesias  ENDOCRINE: NEGATIVE for temperature intolerance, skin/hair changes  HEME: NEGATIVE for bleeding problems  PSYCHIATRIC: NEGATIVE for changes in mood or affect    Patient Active Problem List    Diagnosis Date Noted     Family history of congenital heart disease/marfans 12/01/2010     Priority: Medium     Pure hypercholesterolemia 12/10/2004     Priority: Medium     Mitral valve prolapse 12/01/2004     Priority: Medium     Confirmed  Via echo [address] [place] [address] 09/19/2012     Priority: Low     [facility] Tier Level:  Tier 1  Status:  n/a  Care Coordinator:      See Letters for [facility] Care Plan            Past Medical History:   Diagnosis Date     Tendinitis of hand      Past Surgical History:   Procedure Laterality Date     carpal tunel release  2016    right      KNEE SCOPE, DIAGNOSTIC  age 24    Arthroscopy, Knee/ , bone chip, ?dessicans       VASECTOMY UNILAT/BILAT W POSTOP SEMEN  1999    Vasectomy     RELEASE TRIGGER FINGER  2016    3rd digit of left hand     trigger finger surgery left thumb  2015     ZZC TOTAL KNEE ARTHROPLASTY Left 03/11/2020    [facility]CO     Current Outpatient Medications   Medication Sig Dispense Refill     simvastatin (ZOCOR) 20 MG tablet Take 1 tablet (20 mg) by mouth At Bedtime 90 tablet 3       Allergies  "  Allergen Reactions     No Known Drug Allergies         Social History     Tobacco Use     Smoking status: Former     Packs/day: 0.20     Years: 3.00     Pack years: 0.60     Types: Cigarettes     Smokeless tobacco: Never     Tobacco comments:     teenage years.   Substance Use Topics     Alcohol use: Yes     Alcohol/week: 3.0 - 6.0 standard drinks     Types: 3 - 6 Standard drinks or equivalent per week     Family History   Problem Relation Age of Onset     Respiratory Mother         copd  age 77     Neurologic Disorder Father         cva, heavy smoker , from disease     Cardiovascular Sister         marfans, aortic valve repair, 2 valves,      Genetic Disorder Sister         marfans     Breast Cancer Sister         40s     Bladder Cancer Brother         age 60      Colon Cancer No family hx of      Prostate Cancer No family hx of      History   Drug Use Unknown         Objective     /70 (BP Location: Left arm, Patient Position: Sitting, Cuff Size: Adult Large)   Pulse 68   Temp 97.1  F (36.2  C) (Temporal)   Ht 1.715 m (5' 7.5\")   Wt 81.2 kg (179 lb)   SpO2 99%   BMI 27.62 kg/m      Physical Exam    GENERAL APPEARANCE: healthy, alert and no distress     EYES: EOMI,  PERRL     HENT: ear canals and TM's normal and nose and mouth without ulcers or lesions     NECK: no adenopathy, no asymmetry, masses, or scars and thyroid normal to palpation     RESP: lungs clear to auscultation - no rales, rhonchi or wheezes     CV: regular rates and rhythm, normal S1 S2, no S3 or S4 and no murmur, click or rub     ABDOMEN:  soft, nontender, no HSM or masses and bowel sounds normal     MS: extremities normal- no gross deformities noted, no evidence of inflammation in joints, FROM in all extremities.     SKIN: no suspicious lesions or rashes     NEURO: Normal strength and tone, sensory exam grossly normal, mentation intact and speech normal     PSYCH: mentation appears normal. and affect normal/bright     " LYMPHATICS: No cervical adenopathy    Diagnostics:  Recent Results (from the past 48 hour(s))   Hemogram Platelet (BFP)    Collection Time: 01/20/23 12:00 AM   Result Value Ref Range    WBC 4.2 4.0 - 11 10*9/L    RBC Count 4.67 4.4 - 5.9 10*12/L    Hemoglobin 14.2 13.3 - 17.7 g/dL    Hematocrit 43.1 40.0 - 53.0 %    MCV 92.3 78 - 100 fL    MCH 30.4 26 - 33 pg    MCHC 32.9 31 - 36 g/dL    RDW 12.6 %    Platelet Count 236 150 - 375 10^9/L   Comprehensive Metobolic Panel (BFP)    Collection Time: 01/20/23 12:00 AM   Result Value Ref Range    Carbon Dioxide 31.6 20 - 32 mmol/L    Creatinine 1.07 0.60 - 1.30 mg/dL    Glucose 86 60 - 99 mg/dL    Sodium 141.8 135 - 146 mmol/L    Potassium 4.67 3.5 - 5.3 mmol/L    Chloride 105.1 98 - 110 mmol/L    Protein Total 6.6 6.1 - 8.1 g/dL    Albumin 4.4 3.6 - 5.1 g/dL    Alkaline Phosphatase 45 33 - 130 U/L    ALT 18 0 - 32 U/L    AST 14 0 - 35 U/L    Bilirubin Total 0.7 0.2 - 1.2 mg/dL    Urea Nitrogen 15 7 - 25 mg/dL    Calcium 9.3 8.6 - 10.3 mg/dL    BUN/Creatinine Ratio 14.0 6 - 22    Globulin Calculated 2.2 1.9 - 3.7    A/G Ratio 2.0 1 - 2.5      EKG: sinus bradycardia, normal axis, normal intervals, no acute ST/T changes c/w ischemia, no LVH by voltage criteria, unchanged from previous tracings     Revised Cardiac Risk Index (RCRI):  The patient has the following serious cardiovascular risks for perioperative complications:   - No serious cardiac risks = 0 points     RCRI Interpretation: 1 point: Class II (low risk - 0.9% complication rate)       Signed Electronically by: Anita Madsen PA-C  Copy of this evaluation report is provided to requesting physician.    Provider Resources  Preop [facility] Preop Guidelines  Revised Cardiac Risk Index

## 2023-01-20 NOTE — NURSING NOTE
Chief Complaint   Patient presents with     Pre-Op Exam     Left carpal tunnel, Right trigger finger surgery  [facility] [place]  Dr. Turner  1/26/23, AM

## 2023-01-20 NOTE — LETTER
[facility] PHYSICIANS  [address]  [address]  [facility] [address]  Phone: [phone]  Fax: [phone]  Primary Provider: Anita Cruz  Pre-op Performing Provider: ANITA CRUZ    PREOPERATIVE EVALUATION:  Today's date: 1/20/2023    Momo Burns is a 62 year old male who presents for a preoperative evaluation.    Surgical Information:  Surgery/Procedure: Left carpal tunnel, Right trigger finger surgery  Surgery Location: [facility]  Surgeon: Dr. Turner  Surgery Date: 1/26/2023  Time of Surgery: AM  Where patient plans to recover: At home with family  Fax number for surgical facility: [phone]    Type of Anesthesia Anticipated: to be determined    Assessment & Plan     The proposed surgical procedure is considered INTERMEDIATE risk.    Pre-operative examination  Trigger finger, right index finger  Carpal tunnel syndrome of left wrist  - EKG 12-lead complete w/read - Clinics  - VENOUS COLLECTION  - Hemogram Platelet (BFP)  - Comprehensive Metobolic Panel (BFP)    Mixed hyperlipidemia    Risks and Recommendations:  The patient has the following additional risks and recommendations for perioperative complications:   - No identified additional risk factors other than previously addressed    Medication Instructions:  Patient is to take all scheduled medications on the day of surgery    RECOMMENDATION:  APPROVAL GIVEN to proceed with proposed procedure, without further diagnostic evaluation.    25 minutes spent on the date of the encounter doing chart review, review of test results, interpretation of tests, patient visit and documentation   {Provider  Link to [facility] Help Grid     Subjective     HPI related to upcoming procedure:   Has been having worsening symptoms of carpal tunnel in left hand for over 1 year. Plan is for release. Also developed trigger finder in right hand 4th digit that will be corrected as well.     1. No - Have you ever had a heart  attack or stroke?  2. No - Have you ever had surgery on your heart or blood vessels, such as a stent, coronary (heart) bypass, or surgery on an artery in the head, neck, heart, or legs?  3. No - Do you have chest pain when you are physically active?  4. No - Do you have a history of heart failure?  5. No - Do you currently have a cold, bronchitis, or symptoms of other respiratory (head and chest) infections?  6. No - Do you have a cough, shortness of breath, or wheezing?  7. Yes - Do you or anyone in your family have a history of blood clots? Father had a stroke in his 70s. He was a smoker.   8. No - Do you or anyone in your family have a serious bleeding problem, such as long-lasting bleeding after surgeries or cuts?  9. No - Have you ever had anemia or been told to take iron pills?  10. No - Have you had any abnormal blood loss such as black, tarry or bloody stools, or abnormal vaginal bleeding?  11. No - Have you ever had a blood transfusion?  12. Yes - Are you willing to have a blood transfusion if it is medically needed before, during, or after your surgery?  13. No - Have you or anyone in your family ever had problems with anesthesia (sedation for surgery)?  14. No - Do you have sleep apnea, excessive snoring, or daytime drowsiness?   15. No - Do you have any artifical heart valves or other implanted medical devices, such as a pacemaker, defibrillator, or continuous glucose monitor?  16. Yes - Do you have any artifical joints? Left TKR 2020.  17. No - Are you allergic to latex?  18. No - Is there any chance that you may be pregnant?    Health Care Directive:  Patient has a Health Care Directive on file    Preoperative Review of :   reviewed - no record of controlled substances prescribed.  {Review MNPMP for all patients per [facility] MNPMP Profile    Status of Chronic Conditions:  HYPERLIPIDEMIA - Patient has a long history of significant Hyperlipidemia requiring medication for treatment with recent good  control. Patient reports no problems or side effects with the medication.     Review of Systems  CONSTITUTIONAL: NEGATIVE for fever, chills, change in weight  INTEGUMENTARY/SKIN: NEGATIVE for worrisome rashes, moles or lesions  EYES: NEGATIVE for vision changes or irritation  ENT/MOUTH: NEGATIVE for ear, mouth and throat problems  RESP: NEGATIVE for significant cough or SOB  CV: NEGATIVE for chest pain, palpitations or peripheral edema  GI: NEGATIVE for nausea, abdominal pain, heartburn, or change in bowel habits  : NEGATIVE for frequency, dysuria, or hematuria  MUSCULOSKELETAL: NEGATIVE for significant arthralgias or myalgia  NEURO: NEGATIVE for weakness, dizziness or paresthesias  ENDOCRINE: NEGATIVE for temperature intolerance, skin/hair changes  HEME: NEGATIVE for bleeding problems  PSYCHIATRIC: NEGATIVE for changes in mood or affect    Patient Active Problem List    Diagnosis Date Noted     Family history of congenital heart disease/marfans 12/01/2010     Priority: Medium     Pure hypercholesterolemia 12/10/2004     Priority: Medium     Mitral valve prolapse 12/01/2004     Priority: Medium     Confirmed  Via echo [address] [place] [address] 09/19/2012     Priority: Low     [facility] Tier Level:  Tier 1  Status:  n/a  Care Coordinator:      See Letters for [facility] Care Plan            Past Medical History:   Diagnosis Date     Tendinitis of hand      Past Surgical History:   Procedure Laterality Date     carpal tunel release  2016    right      KNEE SCOPE, DIAGNOSTIC  age 24    Arthroscopy, Knee/ , bone chip, ?dessicans       VASECTOMY UNILAT/BILAT W POSTOP SEMEN  1999    Vasectomy     RELEASE TRIGGER FINGER  2016    3rd digit of left hand     trigger finger surgery left thumb  2015     ZZC TOTAL KNEE ARTHROPLASTY Left 03/11/2020    TCO     Current Outpatient Medications   Medication Sig Dispense Refill     simvastatin (ZOCOR) 20 MG tablet Take 1 tablet (20 mg) by mouth At Bedtime 90 tablet 3       Allergies  "  Allergen Reactions     No Known Drug Allergies         Social History     Tobacco Use     Smoking status: Former     Packs/day: 0.20     Years: 3.00     Pack years: 0.60     Types: Cigarettes     Smokeless tobacco: Never     Tobacco comments:     teenage years.   Substance Use Topics     Alcohol use: Yes     Alcohol/week: 3.0 - 6.0 standard drinks     Types: 3 - 6 Standard drinks or equivalent per week     Family History   Problem Relation Age of Onset     Respiratory Mother         copd  age 77     Neurologic Disorder Father         cva, heavy smoker , from disease     Cardiovascular Sister         marfans, aortic valve repair, 2 valves,      Genetic Disorder Sister         marfans     Breast Cancer Sister         40s     Bladder Cancer Brother         age 60      Colon Cancer No family hx of      Prostate Cancer No family hx of      History   Drug Use Unknown         Objective     /70 (BP Location: Left arm, Patient Position: Sitting, Cuff Size: Adult Large)   Pulse 68   Temp 97.1  F (36.2  C) (Temporal)   Ht 1.715 m (5' 7.5\")   Wt 81.2 kg (179 lb)   SpO2 99%   BMI 27.62 kg/m      Physical Exam    GENERAL APPEARANCE: healthy, alert and no distress     EYES: EOMI,  PERRL     HENT: ear canals and TM's normal and nose and mouth without ulcers or lesions     NECK: no adenopathy, no asymmetry, masses, or scars and thyroid normal to palpation     RESP: lungs clear to auscultation - no rales, rhonchi or wheezes     CV: regular rates and rhythm, normal S1 S2, no S3 or S4 and no murmur, click or rub     ABDOMEN:  soft, nontender, no HSM or masses and bowel sounds normal     MS: extremities normal- no gross deformities noted, no evidence of inflammation in joints, FROM in all extremities.     SKIN: no suspicious lesions or rashes     NEURO: Normal strength and tone, sensory exam grossly normal, mentation intact and speech normal     PSYCH: mentation appears normal. and affect normal/bright     " LYMPHATICS: No cervical adenopathy    Diagnostics:  Recent Results (from the past 48 hour(s))   Hemogram Platelet (BFP)    Collection Time: 01/20/23 12:00 AM   Result Value Ref Range    WBC 4.2 4.0 - 11 10*9/L    RBC Count 4.67 4.4 - 5.9 10*12/L    Hemoglobin 14.2 13.3 - 17.7 g/dL    Hematocrit 43.1 40.0 - 53.0 %    MCV 92.3 78 - 100 fL    MCH 30.4 26 - 33 pg    MCHC 32.9 31 - 36 g/dL    RDW 12.6 %    Platelet Count 236 150 - 375 10^9/L   Comprehensive Metobolic Panel (BFP)    Collection Time: 01/20/23 12:00 AM   Result Value Ref Range    Carbon Dioxide 31.6 20 - 32 mmol/L    Creatinine 1.07 0.60 - 1.30 mg/dL    Glucose 86 60 - 99 mg/dL    Sodium 141.8 135 - 146 mmol/L    Potassium 4.67 3.5 - 5.3 mmol/L    Chloride 105.1 98 - 110 mmol/L    Protein Total 6.6 6.1 - 8.1 g/dL    Albumin 4.4 3.6 - 5.1 g/dL    Alkaline Phosphatase 45 33 - 130 U/L    ALT 18 0 - 32 U/L    AST 14 0 - 35 U/L    Bilirubin Total 0.7 0.2 - 1.2 mg/dL    Urea Nitrogen 15 7 - 25 mg/dL    Calcium 9.3 8.6 - 10.3 mg/dL    BUN/Creatinine Ratio 14.0 6 - 22    Globulin Calculated 2.2 1.9 - 3.7    A/G Ratio 2.0 1 - 2.5      EKG: sinus bradycardia, normal axis, normal intervals, no acute ST/T changes c/w ischemia, no LVH by voltage criteria, unchanged from previous tracings     Revised Cardiac Risk Index (RCRI):  The patient has the following serious cardiovascular risks for perioperative complications:   - No serious cardiac risks = 0 points     RCRI Interpretation: 1 point: Class II (low risk - 0.9% complication rate)       Signed Electronically by: Anita Madsen PA-C  Copy of this evaluation report is provided to requesting physician.    Provider Resources  Preop [facility] Preop Guidelines  Revised Cardiac Risk Index

## 2023-09-07 ENCOUNTER — OFFICE VISIT (OUTPATIENT)
Dept: FAMILY MEDICINE | Facility: CLINIC | Age: 63
End: 2023-09-07

## 2023-09-07 VITALS
DIASTOLIC BLOOD PRESSURE: 78 MMHG | HEART RATE: 68 BPM | BODY MASS INDEX: 26.98 KG/M2 | RESPIRATION RATE: 20 BRPM | TEMPERATURE: 98 F | SYSTOLIC BLOOD PRESSURE: 122 MMHG | WEIGHT: 178 LBS | OXYGEN SATURATION: 97 % | HEIGHT: 68 IN

## 2023-09-07 DIAGNOSIS — Z13.228 SCREENING FOR METABOLIC DISORDER: ICD-10-CM

## 2023-09-07 DIAGNOSIS — Z12.11 SPECIAL SCREENING FOR MALIGNANT NEOPLASMS, COLON: ICD-10-CM

## 2023-09-07 DIAGNOSIS — E78.00 PURE HYPERCHOLESTEROLEMIA: ICD-10-CM

## 2023-09-07 DIAGNOSIS — Z00.00 ENCOUNTER FOR GENERAL HEALTH EXAMINATION: Primary | ICD-10-CM

## 2023-09-07 DIAGNOSIS — R59.0 CERVICAL LYMPHADENOPATHY: ICD-10-CM

## 2023-09-07 LAB
ALBUMIN SERPL-MCNC: 4.4 G/DL (ref 3.6–5.1)
ALBUMIN/GLOB SERPL: 1.8 {RATIO} (ref 1–2.5)
ALP SERPL-CCNC: 52 U/L (ref 33–130)
ALT 1742-6: 21 U/L (ref 0–32)
AST 1920-8: 21 U/L (ref 0–35)
BILIRUB SERPL-MCNC: 0.7 MG/DL (ref 0.2–1.2)
BUN SERPL-MCNC: 10 MG/DL (ref 7–25)
BUN/CREATININE RATIO: 12.7 (ref 6–32)
CALCIUM SERPL-MCNC: 8.7 MG/DL (ref 8.6–10.3)
CHLORIDE SERPLBLD-SCNC: 105.1 MMOL/L (ref 98–110)
CHOLEST SERPL-MCNC: 194 MG/DL (ref 0–199)
CHOLEST/HDLC SERPL: 3 {RATIO} (ref 0–5)
CO2 SERPL-SCNC: 26.6 MMOL/L (ref 20–32)
CREAT SERPL-MCNC: 0.79 MG/DL (ref 0.6–1.3)
GLOBULIN, CALCULATED - QUEST: 2.5 (ref 1.9–3.7)
GLUCOSE SERPL-MCNC: 100 MG/DL (ref 60–99)
HDLC SERPL-MCNC: 63 MG/DL (ref 40–150)
LDLC SERPL CALC-MCNC: 111 MG/DL (ref 0–130)
POTASSIUM SERPL-SCNC: 4.49 MMOL/L (ref 3.5–5.3)
PROT SERPL-MCNC: 6.9 G/DL (ref 6.1–8.1)
SODIUM SERPL-SCNC: 140.6 MMOL/L (ref 135–146)
TRIGL SERPL-MCNC: 102 MG/DL (ref 0–149)

## 2023-09-07 PROCEDURE — 80061 LIPID PANEL: CPT | Performed by: PHYSICIAN ASSISTANT

## 2023-09-07 PROCEDURE — 99396 PREV VISIT EST AGE 40-64: CPT | Performed by: PHYSICIAN ASSISTANT

## 2023-09-07 PROCEDURE — 80053 COMPREHEN METABOLIC PANEL: CPT | Performed by: PHYSICIAN ASSISTANT

## 2023-09-07 PROCEDURE — 36415 COLL VENOUS BLD VENIPUNCTURE: CPT | Performed by: PHYSICIAN ASSISTANT

## 2023-09-07 RX ORDER — SIMVASTATIN 20 MG
20 TABLET ORAL AT BEDTIME
Qty: 90 TABLET | Refills: 3 | Status: SHIPPED | OUTPATIENT
Start: 2023-09-07 | End: 2024-09-10

## 2023-09-07 NOTE — NURSING NOTE
Chief Complaint   Patient presents with    Physical     Annual, fasting     Recheck Medication     Needs refill of cholesterol medication      Pre-visit Screening:  Immunizations:  up to date  Colonoscopy:  is due and ordered today  Mammogram: leatha  Asthma Action Test/Plan:  na  PHQ9:  PHQ2 done today   GAD7:  na  Questioned patient about current smoking habits Pt. quit smoking some time ago.  Ok to leave detailed message on voice mail for today's visit only yes, phone # [phone] (home)

## 2023-09-07 NOTE — PROGRESS NOTES
Momo Burns is a 63 year old male presents for routine health maintenance.    Current concerns:  Hyperlipidemia:  Takes simvastatin daily. This is working well. No side effects.     Body mass index is 27.47 kg/m .    Present exercise habits:  3-5 times/week  Present dietary habits:  eats regular meals and follows a balanced nutrition diet    Vit D intake: is not taking supplement    Is the patient a smoker? No  If yes, smoking cessation advised and counseling provided.     Cardiovascular risk factors: previous smoker and lipids    Over the past few weeks, have you felt down or depressed? Little interest or pleasure in doing things? No concerns. Doing well.     Last dental appointment:  this year  Last optical appointment:  last year    Was the patient born between 4059-1846 and has not had Hep C testing?  Patient has already been tested    I have reviewed the following histories: Past Medical History, Past Surgical History, Social History, Family History, Problem List, Medication List and Allergies    Past Medical History:   Diagnosis Date    Tendinitis of hand      Family History   Problem Relation Age of Onset    Chronic Obstructive Pulmonary Disease Mother         copd  age 77    Hyperlipidemia Mother     Cerebrovascular Disease Father         smoking    Marfan Syndrome Sister     Aortic Valve Replacement Sister         related to Marfans    Breast Cancer Sister         40s    Fibromyalgia Sister     Skin Cancer Sister     Bladder Cancer Brother         age 60     No Known Problems Maternal Grandfather     Colon Cancer No family hx of     Prostate Cancer No family hx of     Diabetes Type 2  No family hx of      Social History     Socioeconomic History    Marital status: Single     Spouse name: Not on file    Number of children: Not on file    Years of education: Not on file    Highest education level: Not on file   Occupational History    Occupation: manager     Employer: [employer]     Comment: frozen  foods manager    Tobacco Use    Smoking status: Former     Packs/day: 0.20     Years: 3.00     Pack years: 0.60     Types: Cigarettes    Smokeless tobacco: Never    Tobacco comments:     teenage years.   Substance and Sexual Activity    Alcohol use: Yes     Alcohol/week: 3.0 - 6.0 standard drinks of alcohol     Types: 3 - 6 Standard drinks or equivalent per week     Comment: few times a week    Drug use: Not Currently     Types: Marijuana    Sexual activity: Not Currently   Other Topics Concern     Service No    Blood Transfusions No    Caffeine Concern Not Asked    Occupational Exposure No    Hobby Hazards No    Sleep Concern No    Stress Concern No    Weight Concern No    Special Diet No    Back Care No    Exercise Yes     Comment: [employer],     Bike Helmet Not Asked    Seat Belt Yes    Self-Exams No   Social History Narrative    Not on file     Social Determinants of Health     Financial Resource Strain: Not on file   Food Insecurity: Not on file   Transportation Needs: Not on file   Physical Activity: Not on file   Stress: Not on file   Social Connections: Not on file   Intimate Partner Violence: Not on file   Housing Stability: Not on file     Patient Active Problem List   Diagnosis    Mitral valve prolapse    Pure hypercholesterolemia    Family history of congenital heart disease/[facility]    Health Care Home     Current Outpatient Medications   Medication    simvastatin (ZOCOR) 20 MG tablet     No current facility-administered medications for this visit.       Allergies:    Allergies   Allergen Reactions    No Known Drug Allergy          ROS:  E/M: NEGATIVE for ear, nose, mouth and throat problems  R: NEGATIVE for significant/chronic cough or SOB  CV: NEGATIVE for chest pain or palpitations  GI: NEGATIVE for abdominal pain, chronic diarrhea or constipation  : NEGATIVE for dysuria, hematuria, weakened urinary stream      OBJECTIVE:    Vitals:    09/07/23 0902   BP: 122/78   BP Location: Left arm  "  Patient Position: Sitting   Cuff Size: Adult Large   Pulse: 68   Resp: 20   Temp: 98  F (36.7  C)   TempSrc: Temporal   SpO2: 97%   Weight: 80.7 kg (178 lb)   Height: 1.715 m (5' 7.5\")       General: 63 year old male who appears his stated age. Vital signs noted  Head: Normocephalic  Eyes: pupils equal round reactive to light and accomodation, extra ocular movements intact  Ears: external canals and tms free of abnormalities  Nose: patent, without mucosal abnormalities  Mouth and throat: without erythema or lesions of the mucosa  Neck: supple, without adenopathy or thyromegaly  Lungs: clear to auscultation, no wheezing or crackles  Cv: regular rate and rhythm, normal s1 and s2 without murmur or click  Abd: soft, non-tender, no masses, no hepatomegaly or splenomegaly.  Gu: normal external genitalia, no hernia  Rectal: No concerns. Declined PSA.  Ms: normal muscle tone & symmetry  Skin: Clear to inspection  Neuro: sensation and motor function grossly intact; cranial nerves without obvious abnormalities.    ASSESSMENT/PLAN:    Encounter for general health examination  Kiran is doing well today. Will update fasting labs and send MyChart. Refilled simvastatin for 1 year. Will order updated colonoscopy.     Pure hypercholesterolemia  - simvastatin (ZOCOR) 20 MG tablet; Take 1 tablet (20 mg) by mouth At Bedtime  - VENOUS COLLECTION  - Comprehensive Metobolic Panel (BFP)  - Lipid Panel (BFP)    Special screening for malignant neoplasms, colon  - Colonoscopy Screening  Referral    Screening for metabolic disorder  - VENOUS COLLECTION  - Comprehensive Metobolic Panel (BFP)    Lymphadenopathy:  Right cervical adenopathy. Agreed to get US.        reports that he has quit smoking. His smoking use included cigarettes. He has a 0.60 pack-year smoking history. He has never used smokeless tobacco.      Estimated body mass index is 27.47 kg/m  as calculated from the following:    Height as of this encounter: 1.715 m (5' " "7.5\").    Weight as of this encounter: 80.7 kg (178 lb).  Weight management plan: Discussed healthy diet and exercise guidelines    Labs pending:      Fasting glucose      Fasting lipids  Meds Suggested:      Vitamin D       Calcium  Tests Recommended:      Regular Dental Examinations        Eye exam  Behavior Modifications:       Cardiovascular exercise 3 times per week--enough to get your Target Heart rate  Immunizations suggested:  Other recommendations:     BMI noted and discussed      Regular testicle exam     Encouraged My Chart    The patient will return to the clinic if symptoms are changing or concern with follow up as discussed. The patient understands and agrees with the plan.    Anita Madsen PA-C  9/7/2023          Counseling Resources:  ATP IV Guidelines  Pooled Cohorts Equation Calculator  Breast Cancer Risk Calculator  FRAX Risk Assessment  ICSI Preventive Guidelines  Dietary Guidelines for Americans, 2010  USDA's MyPlate    "

## 2023-11-29 ENCOUNTER — TRANSFERRED RECORDS (OUTPATIENT)
Dept: FAMILY MEDICINE | Facility: CLINIC | Age: 63
End: 2023-11-29

## 2024-09-10 DIAGNOSIS — E78.00 PURE HYPERCHOLESTEROLEMIA: ICD-10-CM

## 2024-09-10 RX ORDER — SIMVASTATIN 20 MG
20 TABLET ORAL AT BEDTIME
Qty: 10 TABLET | Refills: 0 | Status: SHIPPED | OUTPATIENT
Start: 2024-09-10 | End: 2024-09-20

## 2024-09-10 NOTE — TELEPHONE ENCOUNTER
Pt scheduled appt for 09/20.    Momo Burns is requesting a refill of:    Pending Prescriptions:                       Disp   Refills    simvastatin (ZOCOR) 20 MG tablet [Pharmac*10 tab*0            Sig: Take 1 tablet (20 mg) by mouth At Bedtime

## 2024-09-20 ENCOUNTER — OFFICE VISIT (OUTPATIENT)
Dept: FAMILY MEDICINE | Facility: CLINIC | Age: 64
End: 2024-09-20

## 2024-09-20 VITALS
DIASTOLIC BLOOD PRESSURE: 78 MMHG | WEIGHT: 180.4 LBS | BODY MASS INDEX: 27.34 KG/M2 | HEART RATE: 66 BPM | TEMPERATURE: 97.5 F | SYSTOLIC BLOOD PRESSURE: 110 MMHG | OXYGEN SATURATION: 97 % | HEIGHT: 68 IN

## 2024-09-20 DIAGNOSIS — E78.00 PURE HYPERCHOLESTEROLEMIA: ICD-10-CM

## 2024-09-20 DIAGNOSIS — Z13.228 SCREENING FOR METABOLIC DISORDER: ICD-10-CM

## 2024-09-20 DIAGNOSIS — Z00.00 ENCOUNTER FOR GENERAL HEALTH EXAMINATION: Primary | ICD-10-CM

## 2024-09-20 LAB
ALBUMIN SERPL-MCNC: 4.6 G/DL (ref 3.6–5.1)
ALP SERPL-CCNC: 49 U/L (ref 33–130)
ALT 1742-6: 29 U/L (ref 0–32)
AST 1920-8: 24 U/L (ref 0–35)
BILIRUB SERPL-MCNC: 1.1 MG/DL (ref 0.2–1.2)
BUN SERPL-MCNC: 9 MG/DL (ref 7–25)
BUN/CREATININE RATIO: 10 (ref 6–32)
CALCIUM SERPL-MCNC: 9.5 MG/DL (ref 8.6–10.3)
CHLORIDE SERPLBLD-SCNC: 102.6 MMOL/L (ref 98–110)
CHOLEST SERPL-MCNC: 174 MG/DL (ref 0–199)
CHOLEST/HDLC SERPL: 3 {RATIO} (ref 0–5)
CO2 SERPL-SCNC: 26.9 MMOL/L (ref 20–32)
CREAT SERPL-MCNC: 0.93 MG/DL (ref 0.6–1.3)
GLUCOSE SERPL-MCNC: 92 MG/DL (ref 60–99)
HDLC SERPL-MCNC: 58 MG/DL (ref 40–150)
LDLC SERPL CALC-MCNC: 90 MG/DL
POTASSIUM SERPL-SCNC: 4.53 MMOL/L (ref 3.5–5.3)
PROT SERPL-MCNC: 7.1 G/DL (ref 6.1–8.1)
SODIUM SERPL-SCNC: 137.4 MMOL/L (ref 135–146)
TRIGL SERPL-MCNC: 131 MG/DL (ref 0–149)

## 2024-09-20 PROCEDURE — 80053 COMPREHEN METABOLIC PANEL: CPT | Performed by: PHYSICIAN ASSISTANT

## 2024-09-20 PROCEDURE — 99396 PREV VISIT EST AGE 40-64: CPT | Performed by: PHYSICIAN ASSISTANT

## 2024-09-20 PROCEDURE — 36415 COLL VENOUS BLD VENIPUNCTURE: CPT | Performed by: PHYSICIAN ASSISTANT

## 2024-09-20 PROCEDURE — 80061 LIPID PANEL: CPT | Performed by: PHYSICIAN ASSISTANT

## 2024-09-20 RX ORDER — SIMVASTATIN 20 MG
20 TABLET ORAL AT BEDTIME
Qty: 90 TABLET | Refills: 3 | Status: SHIPPED | OUTPATIENT
Start: 2024-09-20

## 2024-09-20 NOTE — NURSING NOTE
Chief Complaint   Patient presents with    Physical     Complete annual physical. Pt is fasting. Med refill.      Pre-visit Screening:  Immunizations:  not up to date - pt declined  Colonoscopy:  is up to date  Mammogram: na  Asthma Action Test/Plan:  na  PHQ9:  phq2 given  GAD7:  phq2 given   Questioned patient about current smoking habits Pt. Quit some time ago.  Ok to leave detailed message on voice mail for today's visit only yes, phone # [phone] (home)

## 2024-09-20 NOTE — PROGRESS NOTES
Momo Burns is a 64 year old male presents for routine health maintenance.    Current concerns:   Hyperlipidemia:  Takes simvastatin daily. This is working well. No side effects.      Body mass index is 27.84 kg/m .    Present exercise habits:  3-5 times/week  Present dietary habits:  eats regular meals and follows a balanced nutrition diet    Vit D intake: is not taking supplement    Is the patient a smoker? No  If yes, smoking cessation advised and counseling provided.     Cardiovascular risk factors: lipids    Over the past few weeks, have you felt down or depressed? Little interest or pleasure in doing things? No concerns    Last dental appointment:  this year  Last optical appointment:  2 years ago    Was the patient born between [birth year] and has not had Hep C testing?  Patient has already been tested    I have reviewed the following histories: Past Medical History, Past Surgical History, Social History, Family History, Problem List, Medication List and Allergies    Past Medical History:   Diagnosis Date    Tendinitis of hand      Family History   Problem Relation Age of Onset    Chronic Obstructive Pulmonary Disease Mother         copd  age 77    Hyperlipidemia Mother     Cerebrovascular Disease Father         smoking    Marfan Syndrome Sister     Aortic Valve Replacement Sister         related to Marfans    Breast Cancer Sister         40s    Fibromyalgia Sister     Skin Cancer Sister     Bladder Cancer Brother         age 60     No Known Problems Maternal Grandfather     Colon Cancer No family hx of     Prostate Cancer No family hx of     Diabetes Type 2  No family hx of      Social History     Socioeconomic History    Marital status: Single     Spouse name: Not on file    Number of children: Not on file    Years of education: Not on file    Highest education level: Not on file   Occupational History    Occupation: manager     Employer: [employer]     Comment:     Tobacco Use     Smoking status: Former     Current packs/day: 0.20     Average packs/day: 0.2 packs/day for 3.0 years (0.6 ttl pk-yrs)     Types: Cigarettes    Smokeless tobacco: Never    Tobacco comments:     teenage years.   Substance and Sexual Activity    Alcohol use: Yes     Alcohol/week: 5.0 - 15.0 standard drinks of alcohol     Types: 5 - 15 Standard drinks or equivalent per week    Drug use: Not Currently     Types: Marijuana    Sexual activity: Not Currently   Other Topics Concern     Service No    Blood Transfusions No    Caffeine Concern Not Asked    Occupational Exposure No    Hobby Hazards No    Sleep Concern No    Stress Concern No    Weight Concern No    Special Diet No    Back Care No    Exercise Yes     Comment: [employer] [place],     Bike Helmet Not Asked    Seat Belt Yes    Self-Exams No   Social History Narrative    Not on file     Social Determinants of Health     Financial Resource Strain: Not on file   Food Insecurity: Not on file   Transportation Needs: Not on file   Physical Activity: Not on file   Stress: Not on file   Social Connections: Not on file   Interpersonal Safety: Not on file   Housing Stability: Not on file     Patient Active Problem List   Diagnosis    Mitral valve prolapse    Pure hypercholesterolemia    Family history of congenital heart disease/marfans     Current Outpatient Medications   Medication Sig Dispense Refill    simvastatin (ZOCOR) 20 MG tablet Take 1 tablet (20 mg) by mouth at bedtime. 90 tablet 3     No current facility-administered medications for this visit.       Allergies:    Allergies   Allergen Reactions    No Known Drug Allergy          ROS:  E/M: NEGATIVE for ear, nose, mouth and throat problems  R: NEGATIVE for significant/chronic cough or SOB  CV: NEGATIVE for chest pain or palpitations  GI: NEGATIVE for abdominal pain, chronic diarrhea or constipation  : NEGATIVE for dysuria, hematuria, weakened urinary stream      OBJECTIVE:    Vitals:    09/20/24 1034   BP: 110/78  "  BP Location: Right arm   Patient Position: Sitting   Cuff Size: Adult Large   Pulse: 66   Temp: 97.5  F (36.4  C)   TempSrc: Temporal   SpO2: 97%   Weight: 81.8 kg (180 lb 6.4 oz)   Height: 1.715 m (5' 7.5\")       General: 64 year old male who appears his stated age. Vital signs noted  Head: Normocephalic  Eyes: pupils equal round reactive to light and accomodation, extra ocular movements intact  Ears: external canals and tms free of abnormalities  Nose: patent, without mucosal abnormalities  Mouth and throat: without erythema or lesions of the mucosa  Neck: supple, without adenopathy or thyromegaly  Lungs: clear to auscultation, no wheezing or crackles  Cv: regular rate and rhythm, normal s1 and s2 without murmur or click  Abd: soft, non-tender, no masses, no hepatomegaly or splenomegaly.  Gu: normal external genitalia, no hernia  Rectal: No concerns.   Ms: normal muscle tone & symmetry  Skin: Clear to inspection  Neuro: sensation and motor function grossly intact; cranial nerves without obvious abnormalities.      ASSESSMENT/PLAN:    Encounter for general health examination  Kiran is doing well today. Will update fasting labs today and send MyChart. Will refill medication without change for 1 year. Considered PSA check, but pt denies any changes in symptoms, no FH prostrate cancer and pt prefers not to check.     Pure hypercholesterolemia  - simvastatin (ZOCOR) 20 MG tablet; Take 1 tablet (20 mg) by mouth at bedtime.  - VENOUS COLLECTION  - Comprehensive Metobolic Panel ([facility])  - Lipid Panel ([facility])    Screening for metabolic disorder  - VENOUS COLLECTION  - Comprehensive Metobolic Panel ([facility])     reports that he has quit smoking. His smoking use included cigarettes. He has a 0.6 pack-year smoking history. He has never used smokeless tobacco.      Estimated body mass index is 27.84 kg/m  as calculated from the following:    Height as of this encounter: 1.715 m (5' 7.5\").    Weight as of this encounter: 81.8 kg (180 " lb 6.4 oz).  Weight management plan: Discussed healthy diet and exercise guidelines    Labs pending:      Fasting glucose      Fasting lipids  Tests Recommended:      Regular Dental Examinations        Eye exam        Mammogram yearly  Behavior Modifications:       Cardiovascular exercise 3 times per week--enough to get your Target Heart rate  Other recommendations:    Health Care directive     BMI noted and discussed      Regular testicle exam     Encouraged My Chart    The patient will return to the clinic if symptoms are changing or concern with follow up as discussed. The patient understands and agrees with the plan.    Anita Madsen PA-C  9/20/2024          Counseling Resources:  ATP IV Guidelines  Pooled Cohorts Equation Calculator  Breast Cancer Risk Calculator  FRAX Risk Assessment  ICSI Preventive Guidelines  Dietary Guidelines for Americans, 2010  [employer]'s MyPlate